# Patient Record
Sex: FEMALE | Race: WHITE | NOT HISPANIC OR LATINO | URBAN - METROPOLITAN AREA
[De-identification: names, ages, dates, MRNs, and addresses within clinical notes are randomized per-mention and may not be internally consistent; named-entity substitution may affect disease eponyms.]

---

## 2020-02-04 ENCOUNTER — INPATIENT (INPATIENT)
Facility: HOSPITAL | Age: 85
LOS: 5 days | Discharge: HOME | DRG: 686 | End: 2020-02-10
Attending: HOSPITALIST | Admitting: HOSPITALIST
Payer: MEDICARE

## 2020-02-04 VITALS
SYSTOLIC BLOOD PRESSURE: 124 MMHG | TEMPERATURE: 98 F | RESPIRATION RATE: 16 BRPM | OXYGEN SATURATION: 97 % | HEART RATE: 84 BPM | DIASTOLIC BLOOD PRESSURE: 68 MMHG | WEIGHT: 115.08 LBS | HEIGHT: 63 IN

## 2020-02-04 DIAGNOSIS — C79.9 SECONDARY MALIGNANT NEOPLASM OF UNSPECIFIED SITE: ICD-10-CM

## 2020-02-04 DIAGNOSIS — R09.89 OTHER SPECIFIED SYMPTOMS AND SIGNS INVOLVING THE CIRCULATORY AND RESPIRATORY SYSTEMS: ICD-10-CM

## 2020-02-04 DIAGNOSIS — Z90.710 ACQUIRED ABSENCE OF BOTH CERVIX AND UTERUS: Chronic | ICD-10-CM

## 2020-02-04 LAB
ALBUMIN SERPL ELPH-MCNC: 3.3 G/DL — SIGNIFICANT CHANGE UP (ref 3.3–5.2)
ALP SERPL-CCNC: 277 U/L — HIGH (ref 40–120)
ALT FLD-CCNC: 30 U/L — SIGNIFICANT CHANGE UP
ANION GAP SERPL CALC-SCNC: 16 MMOL/L — SIGNIFICANT CHANGE UP (ref 5–17)
AST SERPL-CCNC: 52 U/L — HIGH
BASOPHILS # BLD AUTO: 0.03 K/UL — SIGNIFICANT CHANGE UP (ref 0–0.2)
BASOPHILS NFR BLD AUTO: 0.1 % — SIGNIFICANT CHANGE UP (ref 0–2)
BILIRUB SERPL-MCNC: 0.8 MG/DL — SIGNIFICANT CHANGE UP (ref 0.4–2)
BLD GP AB SCN SERPL QL: SIGNIFICANT CHANGE UP
BUN SERPL-MCNC: 34 MG/DL — HIGH (ref 8–20)
CALCIUM SERPL-MCNC: 9.7 MG/DL — SIGNIFICANT CHANGE UP (ref 8.6–10.2)
CHLORIDE SERPL-SCNC: 87 MMOL/L — LOW (ref 98–107)
CO2 SERPL-SCNC: 20 MMOL/L — LOW (ref 22–29)
CREAT SERPL-MCNC: 1.03 MG/DL — SIGNIFICANT CHANGE UP (ref 0.5–1.3)
EOSINOPHIL # BLD AUTO: 0.07 K/UL — SIGNIFICANT CHANGE UP (ref 0–0.5)
EOSINOPHIL NFR BLD AUTO: 0.3 % — SIGNIFICANT CHANGE UP (ref 0–6)
GLUCOSE SERPL-MCNC: 88 MG/DL — SIGNIFICANT CHANGE UP (ref 70–99)
HCT VFR BLD CALC: 33.5 % — LOW (ref 34.5–45)
HGB BLD-MCNC: 10.7 G/DL — LOW (ref 11.5–15.5)
IMM GRANULOCYTES NFR BLD AUTO: 0.8 % — SIGNIFICANT CHANGE UP (ref 0–1.5)
LIDOCAIN IGE QN: 34 U/L — SIGNIFICANT CHANGE UP (ref 22–51)
LYMPHOCYTES # BLD AUTO: 0.54 K/UL — LOW (ref 1–3.3)
LYMPHOCYTES # BLD AUTO: 2.5 % — LOW (ref 13–44)
MCHC RBC-ENTMCNC: 27.5 PG — SIGNIFICANT CHANGE UP (ref 27–34)
MCHC RBC-ENTMCNC: 31.9 GM/DL — LOW (ref 32–36)
MCV RBC AUTO: 86.1 FL — SIGNIFICANT CHANGE UP (ref 80–100)
MONOCYTES # BLD AUTO: 0.99 K/UL — HIGH (ref 0–0.9)
MONOCYTES NFR BLD AUTO: 4.7 % — SIGNIFICANT CHANGE UP (ref 2–14)
NEUTROPHILS # BLD AUTO: 19.4 K/UL — HIGH (ref 1.8–7.4)
NEUTROPHILS NFR BLD AUTO: 91.6 % — HIGH (ref 43–77)
NT-PROBNP SERPL-SCNC: 2397 PG/ML — HIGH (ref 0–300)
PLATELET # BLD AUTO: 405 K/UL — HIGH (ref 150–400)
POTASSIUM SERPL-MCNC: 4.9 MMOL/L — SIGNIFICANT CHANGE UP (ref 3.5–5.3)
POTASSIUM SERPL-SCNC: 4.9 MMOL/L — SIGNIFICANT CHANGE UP (ref 3.5–5.3)
PROT SERPL-MCNC: 6.6 G/DL — SIGNIFICANT CHANGE UP (ref 6.6–8.7)
RBC # BLD: 3.89 M/UL — SIGNIFICANT CHANGE UP (ref 3.8–5.2)
RBC # FLD: 12.6 % — SIGNIFICANT CHANGE UP (ref 10.3–14.5)
SODIUM SERPL-SCNC: 123 MMOL/L — LOW (ref 135–145)
WBC # BLD: 21.2 K/UL — HIGH (ref 3.8–10.5)
WBC # FLD AUTO: 21.2 K/UL — HIGH (ref 3.8–10.5)

## 2020-02-04 PROCEDURE — 93970 EXTREMITY STUDY: CPT | Mod: 26

## 2020-02-04 PROCEDURE — 72125 CT NECK SPINE W/O DYE: CPT | Mod: 26

## 2020-02-04 PROCEDURE — 99285 EMERGENCY DEPT VISIT HI MDM: CPT

## 2020-02-04 PROCEDURE — 74176 CT ABD & PELVIS W/O CONTRAST: CPT | Mod: 26

## 2020-02-04 PROCEDURE — 99223 1ST HOSP IP/OBS HIGH 75: CPT

## 2020-02-04 PROCEDURE — 70450 CT HEAD/BRAIN W/O DYE: CPT | Mod: 26

## 2020-02-04 PROCEDURE — 73090 X-RAY EXAM OF FOREARM: CPT | Mod: 26,LT

## 2020-02-04 PROCEDURE — 71275 CT ANGIOGRAPHY CHEST: CPT | Mod: 26

## 2020-02-04 PROCEDURE — 71250 CT THORAX DX C-: CPT | Mod: 26,59

## 2020-02-04 PROCEDURE — 93010 ELECTROCARDIOGRAM REPORT: CPT

## 2020-02-04 PROCEDURE — 73080 X-RAY EXAM OF ELBOW: CPT | Mod: 26,LT

## 2020-02-04 RX ORDER — TETANUS TOXOID, REDUCED DIPHTHERIA TOXOID AND ACELLULAR PERTUSSIS VACCINE, ADSORBED 5; 2.5; 8; 8; 2.5 [IU]/.5ML; [IU]/.5ML; UG/.5ML; UG/.5ML; UG/.5ML
0.5 SUSPENSION INTRAMUSCULAR ONCE
Refills: 0 | Status: COMPLETED | OUTPATIENT
Start: 2020-02-04 | End: 2020-02-04

## 2020-02-04 RX ORDER — LANOLIN ALCOHOL/MO/W.PET/CERES
3 CREAM (GRAM) TOPICAL AT BEDTIME
Refills: 0 | Status: DISCONTINUED | OUTPATIENT
Start: 2020-02-04 | End: 2020-02-10

## 2020-02-04 RX ORDER — AZITHROMYCIN 500 MG/1
500 TABLET, FILM COATED ORAL ONCE
Refills: 0 | Status: COMPLETED | OUTPATIENT
Start: 2020-02-04 | End: 2020-02-04

## 2020-02-04 RX ORDER — HYDROMORPHONE HYDROCHLORIDE 2 MG/ML
0.5 INJECTION INTRAMUSCULAR; INTRAVENOUS; SUBCUTANEOUS EVERY 4 HOURS
Refills: 0 | Status: DISCONTINUED | OUTPATIENT
Start: 2020-02-04 | End: 2020-02-10

## 2020-02-04 RX ORDER — MORPHINE SULFATE 50 MG/1
2 CAPSULE, EXTENDED RELEASE ORAL ONCE
Refills: 0 | Status: DISCONTINUED | OUTPATIENT
Start: 2020-02-04 | End: 2020-02-04

## 2020-02-04 RX ORDER — ENOXAPARIN SODIUM 100 MG/ML
50 INJECTION SUBCUTANEOUS ONCE
Refills: 0 | Status: COMPLETED | OUTPATIENT
Start: 2020-02-04 | End: 2020-02-04

## 2020-02-04 RX ORDER — OXYCODONE HYDROCHLORIDE 5 MG/1
5 TABLET ORAL EVERY 4 HOURS
Refills: 0 | Status: DISCONTINUED | OUTPATIENT
Start: 2020-02-04 | End: 2020-02-10

## 2020-02-04 RX ORDER — GABAPENTIN 400 MG/1
200 CAPSULE ORAL THREE TIMES A DAY
Refills: 0 | Status: DISCONTINUED | OUTPATIENT
Start: 2020-02-04 | End: 2020-02-10

## 2020-02-04 RX ORDER — CEFTRIAXONE 500 MG/1
1000 INJECTION, POWDER, FOR SOLUTION INTRAMUSCULAR; INTRAVENOUS ONCE
Refills: 0 | Status: COMPLETED | OUTPATIENT
Start: 2020-02-04 | End: 2020-02-04

## 2020-02-04 RX ORDER — SODIUM CHLORIDE 9 MG/ML
1000 INJECTION, SOLUTION INTRAVENOUS
Refills: 0 | Status: DISCONTINUED | OUTPATIENT
Start: 2020-02-04 | End: 2020-02-05

## 2020-02-04 RX ORDER — VERAPAMIL HCL 240 MG
40 CAPSULE, EXTENDED RELEASE PELLETS 24 HR ORAL EVERY 8 HOURS
Refills: 0 | Status: DISCONTINUED | OUTPATIENT
Start: 2020-02-04 | End: 2020-02-08

## 2020-02-04 RX ADMIN — Medication 3 MILLIGRAM(S): at 22:52

## 2020-02-04 RX ADMIN — Medication 40 MILLIGRAM(S): at 22:51

## 2020-02-04 RX ADMIN — CEFTRIAXONE 100 MILLIGRAM(S): 500 INJECTION, POWDER, FOR SOLUTION INTRAMUSCULAR; INTRAVENOUS at 19:51

## 2020-02-04 RX ADMIN — GABAPENTIN 200 MILLIGRAM(S): 400 CAPSULE ORAL at 22:50

## 2020-02-04 RX ADMIN — MORPHINE SULFATE 2 MILLIGRAM(S): 50 CAPSULE, EXTENDED RELEASE ORAL at 14:18

## 2020-02-04 RX ADMIN — ENOXAPARIN SODIUM 50 MILLIGRAM(S): 100 INJECTION SUBCUTANEOUS at 20:01

## 2020-02-04 RX ADMIN — TETANUS TOXOID, REDUCED DIPHTHERIA TOXOID AND ACELLULAR PERTUSSIS VACCINE, ADSORBED 0.5 MILLILITER(S): 5; 2.5; 8; 8; 2.5 SUSPENSION INTRAMUSCULAR at 13:21

## 2020-02-04 RX ADMIN — HYDROMORPHONE HYDROCHLORIDE 0.5 MILLIGRAM(S): 2 INJECTION INTRAMUSCULAR; INTRAVENOUS; SUBCUTANEOUS at 20:02

## 2020-02-04 RX ADMIN — AZITHROMYCIN 255 MILLIGRAM(S): 500 TABLET, FILM COATED ORAL at 19:49

## 2020-02-04 NOTE — H&P ADULT - HISTORY OF PRESENT ILLNESS
88 yo female with Bladder cancer presented with complaints of lower back pain and hip pain after fall at home . Pt had fallen yesterday at home while walking , her legs were numb and fall backwards . Pt is having pain since yesterday lower back across and both hip area groin , unable  to walk and stand , pain worse on movements . She also reports discomfort  and swelling in lower extremities. She denies any cough , no fever denies any respiratory symptoms , [facility] and [facility]

## 2020-02-04 NOTE — ED ADULT TRIAGE NOTE - CHIEF COMPLAINT QUOTE
Mechanical fall while walking with walker today at 0300, unable to get up after fall until neighbor arrived and called EMS.  Hit back of head.  Denies LOC or blood thinners.  Skin tears to left arm present upon arrival.

## 2020-02-04 NOTE — ED PROVIDER NOTE - OBJECTIVE STATEMENT
The patient is a 89 year old female presents with mechanical fall complaining of pelvic pain. Hit the head but no LOC and No HA  No neck pain, No CP, No SOB, No abd pain  The patient unable to ambulate

## 2020-02-04 NOTE — ED ADULT NURSE NOTE - OBJECTIVE STATEMENT
Pt is here with c/o back pain and L arm pain from a fall.  P/s she got OOB at 3am with her walker  and when she went to go back to bed she tripped and fell backwards.  P/s she was unable to get up and remained on the floor for approx 7 hours.  Pt denies hitting her head, denies LOC.  Pt is a/ox3, and  c/o low back pain and L arm pain.

## 2020-02-04 NOTE — H&P ADULT - ASSESSMENT
89 yo female with bladder  cancer presented to the ED with fall , hip pain and lower back pain , unable to walk since fall. In the ED found to have leukocytosis  CT of chest abd showed hepatic lesion , lung nodules probable ,mets and lung opacity     1- lower back pain with leg pain s/p fall   fracture ruled out   pain meds  PT ambulated     US of lower ext r/o DVT     2- Bladder ca with heaptic lesion and bone mets   will try to reach her oncologist to discuss prognosis nad all findings   cont pain meds as needed     3- HTN - cont home meds with holding parameters   cont verapamil

## 2020-02-04 NOTE — ED PROVIDER NOTE - CLINICAL SUMMARY MEDICAL DECISION MAKING FREE TEXT BOX
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

## 2020-02-04 NOTE — H&P ADULT - NSHPLABSRESULTS_GEN_ALL_CORE
10.7   21.20 )-----------( 405      ( 04 Feb 2020 13:24 )             33.5   02-04    123<L>  |  87<L>  |  34.0<H>  ----------------------------<  88  4.9   |  20.0<L>  |  1.03    Ca    9.7      04 Feb 2020 13:24    TPro  6.6  /  Alb  3.3  /  TBili  0.8  /  DBili  x   /  AST  52<H>  /  ALT  30  /  AlkPhos  277<H>  02-04    Serum Pro-Brain Natriuretic Peptide: 2397: NT-proBNP Interpretive comments:  Acute Congestive Heart Failure is unlikely if NT-proBNP is less than 300  pg/mL, for any age.  Consider Acute Congestive Heart Failure if:  AGE               NT-proBNP Result  ___               ________________  < 50year           > 450 pg/mL  50 - 75 years     > 900 pg/mL  > 75 years         > 1800 pg/ml  All results require clinical correlation. Consider obtaining a baseline or  "dry" NT-proBNP level when the patient is stabilized, so that subsequent  levels can be related to that. Patients with recurrent CHF may have  elevated  NT-proBNP levels. Acute failure episodes generally produce levels at  least 25  % greater than base line levels. The above values are derived from a large  multi-center international study, "GEOVANY Roth, et al, European Heart  Journal,  2006; 27:330-337. pg/mL (02.04.20 @ 14:55)  < from: Xray Forearm, Left (02.04.20 @ 12:27) >    IMPRESSION: No acutebony finding.    Soft tissue air around the elbow suggests local trauma.    < end of copied text >    < from: CT Chest No Cont (02.04.20 @ 12:45) >    IMPRESSION:     6.2 cm mass along the right bladder wall consistent with bladder cancer which results in moderate right hydronephrosis.    Hepatic metastasis.    Pulmonary nodules, enlarged subcarinal lymph node, and right iliac chain lymphadenopathy, likely metastatic disease.    6 cm pelvic mass with associated destruction of the right inferior pubic ramus, likely a metastasis.    Vague areas of sclerosis in the thoracic spine, possibly metastatic disease; a thoracic spine MRI could be obtained for further evaluation.    2.4 cm peripheral opacity in the right lower lobe; differential includes pneumonia, atelectasis or a pulmonary infarct; a CTA of the chest could be obtained for further evaluation if there are no contraindications to intravenous contrast.    The findings were discussed with Dr. Berry at 2:35 PM on 2/4/2020.      < end of copied text >

## 2020-02-05 LAB
ANION GAP SERPL CALC-SCNC: 13 MMOL/L — SIGNIFICANT CHANGE UP (ref 5–17)
APPEARANCE UR: CLEAR — SIGNIFICANT CHANGE UP
BACTERIA # UR AUTO: NEGATIVE — SIGNIFICANT CHANGE UP
BILIRUB UR-MCNC: NEGATIVE — SIGNIFICANT CHANGE UP
BUN SERPL-MCNC: 33 MG/DL — HIGH (ref 8–20)
CALCIUM SERPL-MCNC: 8.7 MG/DL — SIGNIFICANT CHANGE UP (ref 8.6–10.2)
CHLORIDE SERPL-SCNC: 89 MMOL/L — LOW (ref 98–107)
CO2 SERPL-SCNC: 21 MMOL/L — LOW (ref 22–29)
COLOR SPEC: YELLOW — SIGNIFICANT CHANGE UP
CREAT SERPL-MCNC: 1.11 MG/DL — SIGNIFICANT CHANGE UP (ref 0.5–1.3)
DIFF PNL FLD: ABNORMAL
EPI CELLS # UR: SIGNIFICANT CHANGE UP
GLUCOSE SERPL-MCNC: 69 MG/DL — LOW (ref 70–99)
GLUCOSE UR QL: NEGATIVE MG/DL — SIGNIFICANT CHANGE UP
HCT VFR BLD CALC: 29.1 % — LOW (ref 34.5–45)
HGB BLD-MCNC: 9.7 G/DL — LOW (ref 11.5–15.5)
KETONES UR-MCNC: ABNORMAL
LEUKOCYTE ESTERASE UR-ACNC: ABNORMAL
MCHC RBC-ENTMCNC: 28.7 PG — SIGNIFICANT CHANGE UP (ref 27–34)
MCHC RBC-ENTMCNC: 33.3 GM/DL — SIGNIFICANT CHANGE UP (ref 32–36)
MCV RBC AUTO: 86.1 FL — SIGNIFICANT CHANGE UP (ref 80–100)
NITRITE UR-MCNC: NEGATIVE — SIGNIFICANT CHANGE UP
OSMOLALITY SERPL: 279 MOSMOL/KG — LOW (ref 280–301)
OSMOLALITY UR: 556 MOSM/KG — SIGNIFICANT CHANGE UP (ref 300–1000)
PH UR: 6 — SIGNIFICANT CHANGE UP (ref 5–8)
PLATELET # BLD AUTO: 351 K/UL — SIGNIFICANT CHANGE UP (ref 150–400)
POTASSIUM SERPL-MCNC: 5 MMOL/L — SIGNIFICANT CHANGE UP (ref 3.5–5.3)
POTASSIUM SERPL-SCNC: 5 MMOL/L — SIGNIFICANT CHANGE UP (ref 3.5–5.3)
PROT UR-MCNC: 100 MG/DL
RBC # BLD: 3.38 M/UL — LOW (ref 3.8–5.2)
RBC # FLD: 12.8 % — SIGNIFICANT CHANGE UP (ref 10.3–14.5)
RBC CASTS # UR COMP ASSIST: SIGNIFICANT CHANGE UP /HPF (ref 0–4)
SODIUM SERPL-SCNC: 123 MMOL/L — LOW (ref 135–145)
SP GR SPEC: 1.01 — SIGNIFICANT CHANGE UP (ref 1.01–1.02)
UROBILINOGEN FLD QL: NEGATIVE MG/DL — SIGNIFICANT CHANGE UP
WBC # BLD: 17.01 K/UL — HIGH (ref 3.8–10.5)
WBC # FLD AUTO: 17.01 K/UL — HIGH (ref 3.8–10.5)
WBC UR QL: SIGNIFICANT CHANGE UP

## 2020-02-05 PROCEDURE — 99233 SBSQ HOSP IP/OBS HIGH 50: CPT

## 2020-02-05 PROCEDURE — 73502 X-RAY EXAM HIP UNI 2-3 VIEWS: CPT | Mod: 26,LT

## 2020-02-05 RX ORDER — KETOROLAC TROMETHAMINE 30 MG/ML
15 SYRINGE (ML) INJECTION EVERY 8 HOURS
Refills: 0 | Status: DISCONTINUED | OUTPATIENT
Start: 2020-02-05 | End: 2020-02-06

## 2020-02-05 RX ORDER — SODIUM CHLORIDE 9 MG/ML
1 INJECTION INTRAMUSCULAR; INTRAVENOUS; SUBCUTANEOUS
Refills: 0 | Status: DISCONTINUED | OUTPATIENT
Start: 2020-02-05 | End: 2020-02-05

## 2020-02-05 RX ORDER — ATORVASTATIN CALCIUM 80 MG/1
20 TABLET, FILM COATED ORAL AT BEDTIME
Refills: 0 | Status: DISCONTINUED | OUTPATIENT
Start: 2020-02-05 | End: 2020-02-10

## 2020-02-05 RX ORDER — SODIUM CHLORIDE 9 MG/ML
1000 INJECTION INTRAMUSCULAR; INTRAVENOUS; SUBCUTANEOUS
Refills: 0 | Status: DISCONTINUED | OUTPATIENT
Start: 2020-02-05 | End: 2020-02-06

## 2020-02-05 RX ORDER — SODIUM CHLORIDE 9 MG/ML
1 INJECTION INTRAMUSCULAR; INTRAVENOUS; SUBCUTANEOUS
Refills: 0 | Status: DISCONTINUED | OUTPATIENT
Start: 2020-02-05 | End: 2020-02-10

## 2020-02-05 RX ORDER — ASPIRIN/CALCIUM CARB/MAGNESIUM 324 MG
81 TABLET ORAL DAILY
Refills: 0 | Status: DISCONTINUED | OUTPATIENT
Start: 2020-02-05 | End: 2020-02-10

## 2020-02-05 RX ORDER — HEPARIN SODIUM 5000 [USP'U]/ML
5000 INJECTION INTRAVENOUS; SUBCUTANEOUS EVERY 12 HOURS
Refills: 0 | Status: DISCONTINUED | OUTPATIENT
Start: 2020-02-05 | End: 2020-02-07

## 2020-02-05 RX ORDER — ACETAMINOPHEN 500 MG
975 TABLET ORAL EVERY 8 HOURS
Refills: 0 | Status: COMPLETED | OUTPATIENT
Start: 2020-02-05 | End: 2020-02-07

## 2020-02-05 RX ADMIN — ATORVASTATIN CALCIUM 20 MILLIGRAM(S): 80 TABLET, FILM COATED ORAL at 21:35

## 2020-02-05 RX ADMIN — OXYCODONE HYDROCHLORIDE 5 MILLIGRAM(S): 5 TABLET ORAL at 10:54

## 2020-02-05 RX ADMIN — OXYCODONE HYDROCHLORIDE 5 MILLIGRAM(S): 5 TABLET ORAL at 11:00

## 2020-02-05 RX ADMIN — SODIUM CHLORIDE 1 GRAM(S): 9 INJECTION INTRAMUSCULAR; INTRAVENOUS; SUBCUTANEOUS at 15:33

## 2020-02-05 RX ADMIN — GABAPENTIN 200 MILLIGRAM(S): 400 CAPSULE ORAL at 05:26

## 2020-02-05 RX ADMIN — Medication 81 MILLIGRAM(S): at 17:58

## 2020-02-05 RX ADMIN — GABAPENTIN 200 MILLIGRAM(S): 400 CAPSULE ORAL at 21:35

## 2020-02-05 RX ADMIN — Medication 15 MILLIGRAM(S): at 21:35

## 2020-02-05 RX ADMIN — GABAPENTIN 200 MILLIGRAM(S): 400 CAPSULE ORAL at 15:34

## 2020-02-05 RX ADMIN — Medication 40 MILLIGRAM(S): at 05:26

## 2020-02-05 RX ADMIN — HEPARIN SODIUM 5000 UNIT(S): 5000 INJECTION INTRAVENOUS; SUBCUTANEOUS at 17:56

## 2020-02-05 RX ADMIN — Medication 15 MILLIGRAM(S): at 22:05

## 2020-02-05 RX ADMIN — SODIUM CHLORIDE 85 MILLILITER(S): 9 INJECTION, SOLUTION INTRAVENOUS at 05:27

## 2020-02-05 RX ADMIN — Medication 3 MILLIGRAM(S): at 21:36

## 2020-02-05 RX ADMIN — SODIUM CHLORIDE 85 MILLILITER(S): 9 INJECTION INTRAMUSCULAR; INTRAVENOUS; SUBCUTANEOUS at 15:31

## 2020-02-05 RX ADMIN — Medication 975 MILLIGRAM(S): at 21:36

## 2020-02-05 RX ADMIN — Medication 975 MILLIGRAM(S): at 15:36

## 2020-02-05 RX ADMIN — Medication 975 MILLIGRAM(S): at 21:34

## 2020-02-05 RX ADMIN — Medication 15 MILLIGRAM(S): at 15:26

## 2020-02-05 RX ADMIN — SODIUM CHLORIDE 1 GRAM(S): 9 INJECTION INTRAMUSCULAR; INTRAVENOUS; SUBCUTANEOUS at 21:35

## 2020-02-05 RX ADMIN — SODIUM CHLORIDE 85 MILLILITER(S): 9 INJECTION INTRAMUSCULAR; INTRAVENOUS; SUBCUTANEOUS at 17:56

## 2020-02-05 NOTE — CONSULT NOTE ADULT - ASSESSMENT
89 yo female with bladder  cancer presented to the ED with fall , hip pain and lower back pain , unable to walk since fall. In the ED found to have leukocytosis  CT of chest abd showed hepatic lesion , lung nodules probable ,mets and lung opacity . Has  Bladder ca with heaptic lesion and bone mets    Now has hyponatremia - most likely SIADH - check w/u  Add NAcl Tabs POF resriction to 1000 cc/  Gentle hydration with NS   Need to know Immunotherapy to r/o as a cause - no obv meds known at present    Needs Neuro eval for LE Wkness    lower back pain with leg pain s/p fall - no Fx     US of lower ext r/o DVT       HTN - cont home meds with holding parameters   cont verapamil

## 2020-02-05 NOTE — PROGRESS NOTE ADULT - ASSESSMENT
88 yo with metastatic bladder cancer with lung , bone lesions per her oncologist Dr Escamilla, admitted post fall with unability to walk and severe intractable pain in the left leg       1- Intractable leg pain s/p fall at home   xray of femur hip ordered     2- hyponatremia likely SIADH   fluid restriction   renal consulted   add na tablets     3- Leukocytosis - cronic per her oncologist   no sign of infection , asymptomatic no fever   PNA is ruled out     4- Metastatic bladder ca bone , lung and hepatic lesions seen     d/w her oncologist     discharge soon to [facility]

## 2020-02-05 NOTE — CONSULT NOTE ADULT - SUBJECTIVE AND OBJECTIVE BOX
89 year old female with HTN with recently diagnosed metastatic bladder cancer with lung, liver and bone metastasis (including a sacral mass). She says that she tripped and fell in her home when it was late at night. She injured her hip and was in a lot of pain and was not able to get up on her own. She remained on the floor, unable to reach her phone, for about 7 hours until a friend of hers who possesses a key to her home entered and helped her.  She has a difficult time bending her legs as it causes a lot of pain. She lives alone.     PAST MEDICAL & SURGICAL HISTORY:  HTN (hypertension)  Cancer of bladder  H/O: hysterectomy    MEDICATIONS  (STANDING):  acetaminophen   Tablet .. 975 milliGRAM(s) Oral every 8 hours  aspirin enteric coated 81 milliGRAM(s) Oral daily  atorvastatin 20 milliGRAM(s) Oral at bedtime  gabapentin 200 milliGRAM(s) Oral three times a day  heparin  Injectable 5000 Unit(s) SubCutaneous every 12 hours  ketorolac   Injectable 15 milliGRAM(s) IV Push every 8 hours  melatonin 3 milliGRAM(s) Oral at bedtime  sodium chloride   Oral Tab/Cap - Peds 1 Gram(s) Oral two times a day  sodium chloride 0.9%. 1000 milliLiter(s) (85 mL/Hr) IV Continuous <Continuous>  verapamil 40 milliGRAM(s) Oral every 8 hours    MEDICATIONS  (PRN):  HYDROmorphone  Injectable 0.5 milliGRAM(s) IV Push every 4 hours PRN Severe Pain (7 - 10)  oxyCODONE    IR 5 milliGRAM(s) Oral every 4 hours PRN Moderate Pain (4 - 6)      Vital Signs Last 24 Hrs  T(C): 37 (05 Feb 2020 17:47), Max: 37 (05 Feb 2020 05:13)  T(F): 98.6 (05 Feb 2020 17:47), Max: 98.6 (05 Feb 2020 05:13)  HR: 78 (05 Feb 2020 21:33) (59 - 85)  BP: 108/58 (05 Feb 2020 21:33) (104/55 - 134/78)  BP(mean): --  RR: 18 (05 Feb 2020 17:47) (18 - 20)  SpO2: 97% (05 Feb 2020 17:47) (96% - 97%)  PE:  Gen: nad  ENT: no lad  GI: soft, nd, nt  MSK: no joint swelling. raising of leg very painful in her left hemipelvis                            9.7    17.01 )-----------( 351      ( 05 Feb 2020 06:37 )             29.1     02-05    123<L>  |  89<L>  |  33.0<H>  ----------------------------<  69<L>  5.0   |  21.0<L>  |  1.11    Ca    8.7      05 Feb 2020 06:37    TPro  6.6  /  Alb  3.3  /  TBili  0.8  /  DBili  x   /  AST  52<H>  /  ALT  30  /  AlkPhos  277<H>  02-04

## 2020-02-05 NOTE — CONSULT NOTE ADULT - ASSESSMENT
1) Metastatic bladder cancer - she is s/p 1 dose of immunotherapy (pembrolizumab) on 1/28/2020. From an oncology perspective she tolerated her first dose of therapy without difficulty. Unfortunately, her fall injury was quite severe and she is not able to ambulate on her own. She normally uses a walker and is able to ambulate but now she is bedridden due to pain. I told her that she needs assistance at home and it is not safe for her to live at home alone. Other than the pain she is experiencing after the fall she had a decent performance status and was independent.  - continue with pain control  - not safe for her to live alone, particularly if she is going to continue with immunotherapy. Treatment is palliative.  - ruling out fracture  - follow up with primary oncologist, Dr. Escamilla, as outpatient to discuss when or if to restart immunotherapy.     2) Normocytic anemia - hgb 9.7. Likely secondary to malignancy but also possible nutritional deficiency. Check ferritin, iron saturation. Vitamin B12. Has been low in the recent past.  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 1) Metastatic bladder cancer - she is s/p 1 dose of immunotherapy (pembrolizumab) on 1/28/2020. From an oncology perspective she tolerated her first dose of therapy without difficulty. Unfortunately, her fall injury was quite severe and she is not able to ambulate on her own. She normally uses a walker and is able to ambulate but now she is bedridden due to pain. I told her that she needs assistance at home and it is not safe for her to live at home alone. Other than the pain she is experiencing after the fall she had a decent performance status and was independent.  - continue with pain control  - not safe for her to live alone, particularly if she is going to continue with immunotherapy. Treatment is palliative.  - ruling out fracture  - follow up with primary oncologist, Dr. Escamilla, as outpatient to discuss when or if to restart immunotherapy.  - palliative care consult  - prognosis poor without treatment but regardless incurable disease.     2) Normocytic anemia - hgb 9.7. Likely secondary to malignancy but also possible nutritional deficiency. Check ferritin, iron saturation. Vitamin B12. Has been low in the recent past.  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

## 2020-02-05 NOTE — PROGRESS NOTE ADULT - SUBJECTIVE AND OBJECTIVE BOX
Internal Medicine Hospitalist Progress Note    follow up for back pain , leg pain s/p fall   seen in am , she is with no lowrr back pain , has pain in the left hip worse with movements         ROS: as above, all remaining ROS are negative.       BACKGROUND:  MEDICATIONS  (STANDING):  acetaminophen   Tablet .. 975 milliGRAM(s) Oral every 8 hours  aspirin enteric coated 81 milliGRAM(s) Oral daily  atorvastatin 20 milliGRAM(s) Oral at bedtime  gabapentin 200 milliGRAM(s) Oral three times a day  heparin  Injectable 5000 Unit(s) SubCutaneous every 12 hours  ketorolac   Injectable 15 milliGRAM(s) IV Push every 8 hours  melatonin 3 milliGRAM(s) Oral at bedtime  sodium chloride   Oral Tab/Cap - Peds 1 Gram(s) Oral two times a day  sodium chloride 0.9%. 1000 milliLiter(s) (85 mL/Hr) IV Continuous <Continuous>  verapamil 40 milliGRAM(s) Oral every 8 hours    MEDICATIONS  (PRN):  HYDROmorphone  Injectable 0.5 milliGRAM(s) IV Push every 4 hours PRN Severe Pain (7 - 10)  oxyCODONE    IR 5 milliGRAM(s) Oral every 4 hours PRN Moderate Pain (4 - 6)    Allergies    No Known Allergies    Intolerances            VITALS:  Vital Signs Last 24 Hrs  T(C): 36.9 (05 Feb 2020 15:15), Max: 37 (04 Feb 2020 19:58)  T(F): 98.4 (05 Feb 2020 15:15), Max: 98.6 (04 Feb 2020 19:58)  HR: 82 (05 Feb 2020 15:15) (59 - 92)  BP: 107/68 (05 Feb 2020 15:15) (104/58 - 134/78)  BP(mean): --  RR: 20 (05 Feb 2020 15:15) (18 - 20)  SpO2: 97% (05 Feb 2020 15:15) (96% - 98%) Daily     Daily   CAPILLARY BLOOD GLUCOSE        I&O's Summary      PHYSICAL EXAM:      Constitutional: awake alert no distress     Neck: supple, no JVD     Back: able to move     Respiratory: CTA bilateral     Cardiovascular: regular s1 /s2     Gastrointestinal: soft no tenderness , BS positive     Extremities: no pretibial edema       Musculoskeletal:can not move left leg due to pain     Psychiatric: normal affect , pleasant           LABS:                        9.7    17.01 )-----------( 351      ( 05 Feb 2020 06:37 )             29.1     02-05    123<L>  |  89<L>  |  33.0<H>  ----------------------------<  69<L>  5.0   |  21.0<L>  |  1.11    Ca    8.7      05 Feb 2020 06:37    TPro  6.6  /  Alb  3.3  /  TBili  0.8  /  DBili  x   /  AST  52<H>  /  ALT  30  /  AlkPhos  277<H>  02-04        Radiology :

## 2020-02-05 NOTE — CONSULT NOTE ADULT - SUBJECTIVE AND OBJECTIVE BOX
Patient is a 89y old  Female who presents with a chief complaint of groin pain post fall (2020 16:49)  Found with low Na 123      HPI:  89 yo female with Bladder cancer presented with complaints of lower back pain and hip pain after fall at home . Pt had fallen yesterday at home while walking , her legs were numb and fall backwards . Pt is having pain since yesterday lower back across and both hip area groin , unable  to walk and stand , pain worse on movements . She also reports discomfort  and swelling in lower extremities. She denies any cough , no fever denies any respiratory symptoms , [facility] (2020 16:49)      PAST MEDICAL & SURGICAL HISTORY:  HTN (hypertension)  Cancer of bladder  H/O: hysterectomy      FAMILY HISTORY:  FH: stroke: dad had  at age 69  FH: diabetes mellitus: mother  at age 79      Social History:   -smoke   - Alcohol   -    Drugs        REVIEW OF SYSTEMS:  CONSTITUTIONAL: No fever, weight loss, or fatigue  EYES: No eye pain, No visual disturbances,   RESPIRATORY: No cough, hemoptysis; - SOB  CARDIOVASCULAR: No chest pain, No palpitations,   -leg swelling  GASTROINTESTINAL: No abdominal , NVD or GIB  GENITOURINARY: No UTI sx/hematuria  NEUROLOGICAL: No HA, +wkness both LEs  MUSCULOSKELETAL: No joint pain, No swelling      Vital Signs Last 24 Hrs  T(C): 36.4 (2020 12:02), Max: 37 (2020 19:58)  T(F): 97.5 (2020 12:02), Max: 98.6 (2020 19:58)  HR: 81 (2020 12:02) (59 - 92)  BP: 106/65 (2020 12:02) (104/58 - 134/78)  BP(mean): --  RR: 18 (2020 07:32) (18 - 18)  SpO2: 96% (2020 07:32) (96% - 98%)    PHYSICAL EXAM:    GENERAL: NAD,   EYES:  conjunctiva and sclera clear  NECK: Supple, No JVD/Carotid Bruit -ve   NERVOUS SYSTEM:  A/O x3, LE power 3/5, equal; UE 3-4/5, equal, DTR +  Lungs : No rales, No rhonchi,   HEART:  No murmur,  No rub, No gallops  ABDOMEN: Soft, NT/ND BS+  EXTREMITIES:  + Peripheral Pulses, - edema  SKIN: No rashes or lesions      LABS:                        9.7    17.01 )-----------( 351      ( 2020 06:37 )             29.1     02-05    123<L>  |  89<L>  |  33.0<H>  ----------------------------<  69<L>  5.0   |  21.0<L>  |  1.11    Ca    8.7      2020 06:37    TPro  6.6  /  Alb  3.3  /  TBili  0.8  /  DBili  x   /  AST  52<H>  /  ALT  30  /  AlkPhos  277<H>  02-04      Urinalysis Basic - ( 2020 10:01 )    Color: Yellow / Appearance: Clear / S.010 / pH: x  Gluc: x / Ketone: Small  / Bili: Negative / Urobili: Negative mg/dL   Blood: x / Protein: 100 mg/dL / Nitrite: Negative   Leuk Esterase: Trace / RBC: 0-2 /HPF / WBC 3-5   Sq Epi: x / Non Sq Epi: Occasional / Bacteria: Negative          RADIOLOGY & ADDITIONAL TESTS: Multiple lung and few liver lesions - c/w mets    MEDICATIONS  (STANDING):  acetaminophen   Tablet ..  atorvastatin  gabapentin  heparin  Injectable  HYDROmorphone  Injectable PRN  ketorolac   Injectable  melatonin  oxyCODONE    IR PRN  sodium chloride  sodium chloride 0.9%.  verapamil

## 2020-02-05 NOTE — PHYSICAL THERAPY INITIAL EVALUATION ADULT - ACTIVE RANGE OF MOTION EXAMINATION, REHAB EVAL
bilateral  lower extremity Active ROM was WFL (within functional limits)/deficits as listed below/Bilateral UE AROM shd flexion 0-45, preexisting shd issues, Limited ROM is baseline
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
## 2020-02-06 DIAGNOSIS — R29.898 OTHER SYMPTOMS AND SIGNS INVOLVING THE MUSCULOSKELETAL SYSTEM: ICD-10-CM

## 2020-02-06 DIAGNOSIS — F41.9 ANXIETY DISORDER, UNSPECIFIED: ICD-10-CM

## 2020-02-06 DIAGNOSIS — I63.81 OTHER CEREBRAL INFARCTION DUE TO OCCLUSION OR STENOSIS OF SMALL ARTERY: ICD-10-CM

## 2020-02-06 DIAGNOSIS — C67.8 MALIGNANT NEOPLASM OF OVERLAPPING SITES OF BLADDER: ICD-10-CM

## 2020-02-06 DIAGNOSIS — Z71.89 OTHER SPECIFIED COUNSELING: ICD-10-CM

## 2020-02-06 LAB
ALBUMIN SERPL ELPH-MCNC: 2.5 G/DL — LOW (ref 3.3–5.2)
ALP SERPL-CCNC: 245 U/L — HIGH (ref 40–120)
ALT FLD-CCNC: 24 U/L — SIGNIFICANT CHANGE UP
ANION GAP SERPL CALC-SCNC: 13 MMOL/L — SIGNIFICANT CHANGE UP (ref 5–17)
AST SERPL-CCNC: 43 U/L — HIGH
BILIRUB SERPL-MCNC: 0.5 MG/DL — SIGNIFICANT CHANGE UP (ref 0.4–2)
BUN SERPL-MCNC: 48 MG/DL — HIGH (ref 8–20)
CALCIUM SERPL-MCNC: 8.5 MG/DL — LOW (ref 8.6–10.2)
CHLORIDE SERPL-SCNC: 94 MMOL/L — LOW (ref 98–107)
CO2 SERPL-SCNC: 19 MMOL/L — LOW (ref 22–29)
CREAT SERPL-MCNC: 1.84 MG/DL — HIGH (ref 0.5–1.3)
GLUCOSE SERPL-MCNC: 90 MG/DL — SIGNIFICANT CHANGE UP (ref 70–99)
HCT VFR BLD CALC: 28.2 % — LOW (ref 34.5–45)
HGB BLD-MCNC: 8.9 G/DL — LOW (ref 11.5–15.5)
MCHC RBC-ENTMCNC: 27.6 PG — SIGNIFICANT CHANGE UP (ref 27–34)
MCHC RBC-ENTMCNC: 31.6 GM/DL — LOW (ref 32–36)
MCV RBC AUTO: 87.3 FL — SIGNIFICANT CHANGE UP (ref 80–100)
PLATELET # BLD AUTO: 342 K/UL — SIGNIFICANT CHANGE UP (ref 150–400)
POTASSIUM SERPL-MCNC: 5.7 MMOL/L — HIGH (ref 3.5–5.3)
POTASSIUM SERPL-SCNC: 5.7 MMOL/L — HIGH (ref 3.5–5.3)
PROT SERPL-MCNC: 5.1 G/DL — LOW (ref 6.6–8.7)
RBC # BLD: 3.23 M/UL — LOW (ref 3.8–5.2)
RBC # FLD: 12.9 % — SIGNIFICANT CHANGE UP (ref 10.3–14.5)
SODIUM SERPL-SCNC: 126 MMOL/L — LOW (ref 135–145)
TSH SERPL-MCNC: 1.11 UIU/ML — SIGNIFICANT CHANGE UP (ref 0.27–4.2)
URATE SERPL-MCNC: 11.2 MG/DL — HIGH (ref 2.4–5.7)
WBC # BLD: 16.9 K/UL — HIGH (ref 3.8–10.5)
WBC # FLD AUTO: 16.9 K/UL — HIGH (ref 3.8–10.5)

## 2020-02-06 PROCEDURE — 99497 ADVNCD CARE PLAN 30 MIN: CPT | Mod: 25

## 2020-02-06 PROCEDURE — 99223 1ST HOSP IP/OBS HIGH 75: CPT

## 2020-02-06 PROCEDURE — 99222 1ST HOSP IP/OBS MODERATE 55: CPT

## 2020-02-06 PROCEDURE — 70551 MRI BRAIN STEM W/O DYE: CPT | Mod: 26

## 2020-02-06 PROCEDURE — 72148 MRI LUMBAR SPINE W/O DYE: CPT | Mod: 26

## 2020-02-06 PROCEDURE — 99233 SBSQ HOSP IP/OBS HIGH 50: CPT

## 2020-02-06 PROCEDURE — 99231 SBSQ HOSP IP/OBS SF/LOW 25: CPT

## 2020-02-06 PROCEDURE — 72146 MRI CHEST SPINE W/O DYE: CPT | Mod: 26

## 2020-02-06 RX ORDER — ALLOPURINOL 300 MG
300 TABLET ORAL DAILY
Refills: 0 | Status: DISCONTINUED | OUTPATIENT
Start: 2020-02-06 | End: 2020-02-10

## 2020-02-06 RX ORDER — ZOLPIDEM TARTRATE 10 MG/1
5 TABLET ORAL AT BEDTIME
Refills: 0 | Status: DISCONTINUED | OUTPATIENT
Start: 2020-02-06 | End: 2020-02-10

## 2020-02-06 RX ORDER — SODIUM CHLORIDE 9 MG/ML
1000 INJECTION, SOLUTION INTRAVENOUS
Refills: 0 | Status: DISCONTINUED | OUTPATIENT
Start: 2020-02-06 | End: 2020-02-07

## 2020-02-06 RX ORDER — POLYETHYLENE GLYCOL 3350 17 G/17G
17 POWDER, FOR SOLUTION ORAL DAILY
Refills: 0 | Status: DISCONTINUED | OUTPATIENT
Start: 2020-02-06 | End: 2020-02-10

## 2020-02-06 RX ORDER — SENNA PLUS 8.6 MG/1
2 TABLET ORAL AT BEDTIME
Refills: 0 | Status: DISCONTINUED | OUTPATIENT
Start: 2020-02-06 | End: 2020-02-10

## 2020-02-06 RX ORDER — SODIUM POLYSTYRENE SULFONATE 4.1 MEQ/G
30 POWDER, FOR SUSPENSION ORAL ONCE
Refills: 0 | Status: COMPLETED | OUTPATIENT
Start: 2020-02-06 | End: 2020-02-06

## 2020-02-06 RX ORDER — TAMSULOSIN HYDROCHLORIDE 0.4 MG/1
0.4 CAPSULE ORAL AT BEDTIME
Refills: 0 | Status: DISCONTINUED | OUTPATIENT
Start: 2020-02-06 | End: 2020-02-10

## 2020-02-06 RX ADMIN — HEPARIN SODIUM 5000 UNIT(S): 5000 INJECTION INTRAVENOUS; SUBCUTANEOUS at 07:05

## 2020-02-06 RX ADMIN — TAMSULOSIN HYDROCHLORIDE 0.4 MILLIGRAM(S): 0.4 CAPSULE ORAL at 22:28

## 2020-02-06 RX ADMIN — SODIUM CHLORIDE 83 MILLILITER(S): 9 INJECTION, SOLUTION INTRAVENOUS at 18:10

## 2020-02-06 RX ADMIN — Medication 975 MILLIGRAM(S): at 07:07

## 2020-02-06 RX ADMIN — Medication 40 MILLIGRAM(S): at 07:04

## 2020-02-06 RX ADMIN — GABAPENTIN 200 MILLIGRAM(S): 400 CAPSULE ORAL at 22:28

## 2020-02-06 RX ADMIN — Medication 81 MILLIGRAM(S): at 13:23

## 2020-02-06 RX ADMIN — Medication 3 MILLIGRAM(S): at 22:28

## 2020-02-06 RX ADMIN — Medication 15 MILLIGRAM(S): at 07:05

## 2020-02-06 RX ADMIN — SODIUM CHLORIDE 1 GRAM(S): 9 INJECTION INTRAMUSCULAR; INTRAVENOUS; SUBCUTANEOUS at 18:09

## 2020-02-06 RX ADMIN — GABAPENTIN 200 MILLIGRAM(S): 400 CAPSULE ORAL at 13:23

## 2020-02-06 RX ADMIN — Medication 975 MILLIGRAM(S): at 22:29

## 2020-02-06 RX ADMIN — Medication 15 MILLIGRAM(S): at 07:07

## 2020-02-06 RX ADMIN — SODIUM CHLORIDE 83 MILLILITER(S): 9 INJECTION, SOLUTION INTRAVENOUS at 13:22

## 2020-02-06 RX ADMIN — GABAPENTIN 200 MILLIGRAM(S): 400 CAPSULE ORAL at 07:05

## 2020-02-06 RX ADMIN — ZOLPIDEM TARTRATE 5 MILLIGRAM(S): 10 TABLET ORAL at 22:28

## 2020-02-06 RX ADMIN — SODIUM POLYSTYRENE SULFONATE 30 GRAM(S): 4.1 POWDER, FOR SUSPENSION ORAL at 08:28

## 2020-02-06 RX ADMIN — Medication 40 MILLIGRAM(S): at 22:28

## 2020-02-06 RX ADMIN — SODIUM CHLORIDE 1 GRAM(S): 9 INJECTION INTRAMUSCULAR; INTRAVENOUS; SUBCUTANEOUS at 07:04

## 2020-02-06 RX ADMIN — Medication 975 MILLIGRAM(S): at 07:04

## 2020-02-06 RX ADMIN — Medication 300 MILLIGRAM(S): at 18:08

## 2020-02-06 RX ADMIN — HEPARIN SODIUM 5000 UNIT(S): 5000 INJECTION INTRAVENOUS; SUBCUTANEOUS at 18:10

## 2020-02-06 RX ADMIN — ATORVASTATIN CALCIUM 20 MILLIGRAM(S): 80 TABLET, FILM COATED ORAL at 22:28

## 2020-02-06 NOTE — CONSULT NOTE ADULT - ASSESSMENT
89 yo female with bladder CA, right hydronephrosis due to bladder tumor obstruction, increased creatinine, lumbar stenosis, cauda equina syndrome most likely  - recommend mark cath for urinary retention  - needs right nephrostomy tube for right ureteral orifice obstruction  - needs neurosurgery consult for lumbar stenosis

## 2020-02-06 NOTE — CONSULT NOTE ADULT - PROBLEM SELECTOR RECOMMENDATION 6
I sat with Patient at her bedside and we discussed her slow deterioration of health andfunctional status. She lives alone, fell and wasn't discovered for many hours.  She is realistic and knows she has a terminal illness. Considering moving to a Nursing Home when discharged for MALINDA Cab[facility]?  We discussed her family, Nenikolay and Nephew, established her friend   Hollie Powell @ [phone] as her HCP; and has already completed [facility] Directives including DNR.DNI.  We have not discussed whether she intended to continue Immunotherapy, which seems unlikely due to difficulty getting to oncologist office for the treatment.  I will explain she would be entitled to Hospice benefit once Immunotherapy is discontinued.  Dr. Escamilla is her Oncologist

## 2020-02-06 NOTE — CONSULT NOTE ADULT - ATTENDING COMMENTS
NSGY Attg:    see above    patient seen and examined by PA staff    imaging reviewed    agree with plan as above
COUNSELING:    Face to face meeting to discuss Advanced Care Planning - Time Spent ___20___ Minutes.  See goals of care note.    More than 50% time spent in counseling and coordinating care. 35___ Minutes.     Thank you for the opportunity to assist with the care of this patient.   [facility] [phone].
patient with Stage 4 bladder cancer with right ureteral obstruction.  Rising creat. If patient desires to maintain maximal renal function would recommend right neph tube.  If only palliative care anticipated then could likely avoid the tube.

## 2020-02-06 NOTE — CONSULT NOTE ADULT - SUBJECTIVE AND OBJECTIVE BOX
HPI:  88 yo female PMH of HTN and  Metastatic Bladder Cancer with last Immunotherapy treatment with Ketruda one week prior to admission. Patient at home using walker sudden weakness felt  in BLLE's before collapsing on floor. Unable to get up off floor independently-was admitted through ED with BLLE weakness; unable to stand or walk, LBP and acute on chronic groin pain.  Associated with increasing swelling in her lower extremities. Appetite poor, eating very small more frequent portions, fluid intake also has decreased. She has insomnia and is taking  Ambien at night. She denies N/V/D/Constipation, CP, Palpitations SOB; not coughing, no fever.     88 yo female with Bladder cancer presented with complaints of lower back pain and hip pain after fall at home . Pt had fallen yesterday at home while walking , her legs were numb and fall backwards . Pt is having pain since yesterday lower back across and both hip area groin , unable  to walk and stand , pain worse on movements . She also reports discomfort  and swelling in lower extremities. She denies any cough , no fever denies any respiratory symptoms , She has HTN and bladder cancer under oncologist care in [facility] had recent immunotherapy (2020 16:49)    PERTINENT PMH REVIEWED: Yes     PAST MEDICAL & SURGICAL HISTORY:  HTN (hypertension)  Cancer of bladder  H/O: hysterectomy      SOCIAL HISTORY:  EtOH    No                                    Drugs    No                                      nonsmoker                                    Admitted from: home Lives alone HCP established today Hollie Powell [phone]     FAMILY HISTORY:  FH: stroke: dad had  at age 69  FH: diabetes mellitus: mother  at age 79    No Known Allergies  Intolerances        Baseline ADLs (prior to admission):  Independent      Present Symptoms:     Dyspnea: 0   Nausea/Vomiting: No  Anxiety:  Yes   Depression:  No  Fatigue: Yes   Loss of appetite: Yes     Pain: Low back B/L hips and groin            Character-            Duration-            Effect-            Factors-associated with weakness-inability to move feet when standing            Frequency-            Location-            Severity-intermittent moderate    Review of Systems: Reviewed                      All others negative    MEDICATIONS  (STANDING):  acetaminophen   Tablet .. 975 milliGRAM(s) Oral every 8 hours  allopurinol 300 milliGRAM(s) Oral daily  aspirin enteric coated 81 milliGRAM(s) Oral daily  atorvastatin 20 milliGRAM(s) Oral at bedtime  gabapentin 200 milliGRAM(s) Oral three times a day  heparin  Injectable 5000 Unit(s) SubCutaneous every 12 hours  melatonin 3 milliGRAM(s) Oral at bedtime  polyethylene glycol 3350 17 Gram(s) Oral daily  senna 2 Tablet(s) Oral at bedtime  sodium chloride   Oral Tab/Cap - Peds 1 Gram(s) Oral two times a day  sodium chloride 0.45% 1000 milliLiter(s) (83 mL/Hr) IV Continuous <Continuous>  tamsulosin 0.4 milliGRAM(s) Oral at bedtime  verapamil 40 milliGRAM(s) Oral every 8 hours    MEDICATIONS  (PRN):  HYDROmorphone  Injectable 0.5 milliGRAM(s) IV Push every 4 hours PRN Severe Pain (7 - 10)  oxyCODONE    IR 5 milliGRAM(s) Oral every 4 hours PRN Moderate Pain (4 - 6)      PHYSICAL EXAM:    Vital Signs Last 24 Hrs  T(C): 36.8 (2020 08:26), Max: 37 (2020 17:47)  T(F): 98.2 (2020 08:26), Max: 98.6 (2020 17:47)  HR: 75 (2020 08:26) (75 - 85)  BP: 105/61 (2020 08:26) (104/55 - 130/76)  BP(mean): --  RR: 18 (2020 08:26) (18 - 18)  SpO2: 97% (2020 08:26) (97% - 98%)    General: alert  oriented x __3__very tired-did not sleep last night                  cachexia      HEENT:  dry mouth      Lungs: comfortable    CV: normal     GI:  distended  tender                 constipation  last BM: denies    : normal voiding small amounts    MSK:  weakness  edema           bedbound/wheelchair bound    Skin: normal __  no rash    LABS:                        8.9    16.90 )-----------( 342      ( 2020 06:48 )             28.2     02-06    126<L>  |  94<L>  |  48.0<H>  ----------------------------<  90  5.7<H>   |  19.0<L>  |  1.84<H>    Ca    8.5<L>      2020 06:48    TPro  5.1<L>  /  Alb  2.5<L>  /  TBili  0.5  /  DBili  x   /  AST  43<H>  /  ALT  24  /  AlkPhos  245<H>  -    Urinalysis Basic - ( 2020 10:01 )    Color: Yellow / Appearance: Clear / S.010 / pH: x  Gluc: x / Ketone: Small  / Bili: Negative / Urobili: Negative mg/dL   Blood: x / Protein: 100 mg/dL / Nitrite: Negative   Leuk Esterase: Trace / RBC: 0-2 /HPF / WBC 3-5   Sq Epi: x / Non Sq Epi: Occasional / Bacteria: Negative    I&O's Summary    2020 07:  -  2020 15:37  --------------------------------------------------------  IN: 0 mL / OUT: 700 mL / NET: -700 mL    RADIOLOGY & ADDITIONAL STUDIES:  < from: MR Head No Cont (20 @ 03:12) >  FINDINGS:    There is a punctate, cortically-based focus of restricted diffusion in the right parietal lobe (series 4, image 25), findings consistent with tiny acute lacunar infarct.  There is no evidence to suggest associated hemorrhage.    There are scattered T2/FLAIR hyperintense changes in the periventricular and subcortical white matter and also in the central gabriel which are nonspecific finding, but most likely represents sequela of moderate chronic microvascular ischemic disease. There is a tiny chronic lacunar infarct in the left caudate nucleus (series 6, image 18). There is mild diffuse cortical sulcal prominence related to underlying brain parenchymal volume loss, which is particularly pronounced in the bilateral parietal and mesial temporal lobes, including the hippocampal formations. Incidentally noted are tiny hippocampal body cysts.    There is no evidence of intracranial mass or hydrocephalus. There are no extra-axial fluid collections.     The visualized skull base flow voids appear patent.    The patient is status post bilateral lens replacement. Visualized intraorbital contents are otherwise unremarkable. There are mild mucosal inflammatory changes of the ethmoid air cells. The mastoid air cells are clear. The visualized soft tissues and osseous structures appear unremarkable.    IMPRESSION:     Tiny cortically-based acute lacunar infarct in the right parietal lobe. No associated hemorrhage.    ADVANCE DIRECTIVES:   DNR YES   Completed on:                     MOLST  YES  Completed on:  Living Will   NO   Completed on:

## 2020-02-06 NOTE — CONSULT NOTE ADULT - PROBLEM SELECTOR RECOMMENDATION 5
Uses Ambien 5 mg PO at bedtime  melatonin already ordered  Very little sleep last night I sat with Patient at her bedside and we discussed her slow deterioration of health andfunctional status. She lives alone, fell and wasn't discovered for many hours.  She is realistic and knows she has a terminal illness. Considering moving to a Nursing Home when discharged for MALINDA Cabrera?  We discussed her family, Nenikolay and Nephew, established her friend   Hollie Powell @ [phone] as her HCP; and has already completed MOLST Directives including DNR.DNI.  We have not discussed whether she intended to continue Immunotherapy, which seems unlikely due to difficulty getting to oncologist office for the treatment.  I will explain she would be entitled to Hospice benefit once Immunotherapy is discontinued.  Dr. Escamilla is her Oncologist

## 2020-02-06 NOTE — PROGRESS NOTE ADULT - SUBJECTIVE AND OBJECTIVE BOX
89 year old female with HTN with recently diagnosed metastatic bladder cancer with lung, liver and bone metastasis (including a sacral mass). She says that she tripped and fell in her home when it was late at night. She injured her hip and was in a lot of pain and was not able to get up on her own. She remained on the floor, unable to reach her phone, for about 7 hours until a friend of hers who possesses a key to her home entered and helped her.  She has a difficult time bending her legs as it causes a lot of pain. She lives alone.   Now developed urinary retention.  Currently denies pain    PAST MEDICAL & SURGICAL HISTORY:  HTN (hypertension)  Cancer of bladder  H/O: hysterectomy    MEDICATIONS  (STANDING):  acetaminophen   Tablet .. 975 milliGRAM(s) Oral every 8 hours  aspirin enteric coated 81 milliGRAM(s) Oral daily  atorvastatin 20 milliGRAM(s) Oral at bedtime  gabapentin 200 milliGRAM(s) Oral three times a day  heparin  Injectable 5000 Unit(s) SubCutaneous every 12 hours  ketorolac   Injectable 15 milliGRAM(s) IV Push every 8 hours  melatonin 3 milliGRAM(s) Oral at bedtime  sodium chloride   Oral Tab/Cap - Peds 1 Gram(s) Oral two times a day  sodium chloride 0.9%. 1000 milliLiter(s) (85 mL/Hr) IV Continuous <Continuous>  verapamil 40 milliGRAM(s) Oral every 8 hours    MEDICATIONS  (PRN):  HYDROmorphone  Injectable 0.5 milliGRAM(s) IV Push every 4 hours PRN Severe Pain (7 - 10)  oxyCODONE    IR 5 milliGRAM(s) Oral every 4 hours PRN Moderate Pain (4 - 6)      Vital Signs Last 24 Hrs  T(C): 37 (05 Feb 2020 17:47), Max: 37 (05 Feb 2020 05:13)  T(F): 98.6 (05 Feb 2020 17:47), Max: 98.6 (05 Feb 2020 05:13)  HR: 78 (05 Feb 2020 21:33) (59 - 85)  BP: 108/58 (05 Feb 2020 21:33) (104/55 - 134/78)  BP(mean): --  RR: 18 (05 Feb 2020 17:47) (18 - 20)  SpO2: 97% (05 Feb 2020 17:47) (96% - 97%)  PE:  Gen: nad  ENT: no lad  GI: soft, nd, nt  MSK: no joint swelling. raising of leg very painful in her left hemipelvis                            9.7    17.01 )-----------( 351      ( 05 Feb 2020 06:37 )             29.1     02-05    123<L>  |  89<L>  |  33.0<H>  ----------------------------<  69<L>  5.0   |  21.0<L>  |  1.11    Ca    8.7      05 Feb 2020 06:37    TPro  6.6  /  Alb  3.3  /  TBili  0.8  /  DBili  x   /  AST  52<H>  /  ALT  30  /  AlkPhos  277<H>  02-04

## 2020-02-06 NOTE — CONSULT NOTE ADULT - ASSESSMENT
89 year old female with HTN with recently diagnosed metastatic bladder cancer with lung, liver and bone metastasis.  Presents this admission s/p fall at home.  She had an MRI of her brain which showed a punctate area of restriction diffusion in the right parietal lobe and she has severe spinal stenosis in the lumbar spine and a possible cystic nodule at L3.       PLAN:  NEURO:   -pain control  -L3 nodule, consider gadolinium enhanced MRI of LS sine to better characterize this nodule (if renal function allows)  -Decision for intervention of spinal stenosis would depend on her prognosis form metastatic bladder cancer  -No acute neurosurgical intervention required  -PT/OT  -supportive and palliative care/ further medical management as per primary team  -Will discuss plan with Dr. Blackmon      Assessment:  Please Check When Present   []  GCS  E   V  M     Heart Failure: []Acute, [] acute on chronic , []chronic  Heart Failure:  [] Diastolic (HFpEF), [] Systolic (HFrEF), []Combined (HFpEF and HFrEF), [] RHF, [] Pulm HTN, [] Other    [] TODD, [] ATN, [] AIN, [] other  [] CKD1, [] CKD2, [] CKD 3, [] CKD 4, [] CKD 5, []ESRD    Encephalopathy: [] Metabolic, [] Hepatic, [] toxic, [] Neurological, [] Other    Abnormal Nurtitional Status: [] malnurtition (see nutrition note), [ ]underweight: BMI < 19, [] morbid obesity: BMI >40, [] Cachexia    [] Sepsis  [] hypovolemic shock,[] cardiogenic shock, [] hemorrhagic shock, [] neuogenic shock  [] Acute Respiratory Failure  []Cerebral edema, [] Brain compression/ herniation,   [] Functional quadriplegia  [] Acute blood loss anemia 89 year old female with HTN with recently diagnosed metastatic bladder cancer with lung, liver and bone metastasis.  Presents this admission s/p fall at home.  She had an MRI of her brain which showed a punctate area of restriction diffusion in the right parietal lobe and she has severe spinal stenosis in the lumbar spine and a possible cystic nodule at L3.       PLAN:  NEURO:   -pain control  -L3 nodule, consider gadolinium enhanced MRI of LS sine to better characterize this nodule (if renal function allows)  -Unlikey to be a candidate for intervention for chronic DDD/spinal stenosis given age, medical comorbidities, and history of metastatic bladder cancer with solid organ (liver) involvmenet  -No acute neurosurgical intervention required at this time  -PT/OT  -supportive and palliative care/ further medical management as per primary team  -Will discuss plan with Dr. Blackmon      Assessment:  Please Check When Present   []  GCS  E   V  M     Heart Failure: []Acute, [] acute on chronic , []chronic  Heart Failure:  [] Diastolic (HFpEF), [] Systolic (HFrEF), []Combined (HFpEF and HFrEF), [] RHF, [] Pulm HTN, [] Other    [] TODD, [] ATN, [] AIN, [] other  [] CKD1, [] CKD2, [] CKD 3, [] CKD 4, [] CKD 5, []ESRD    Encephalopathy: [] Metabolic, [] Hepatic, [] toxic, [] Neurological, [] Other    Abnormal Nurtitional Status: [] malnurtition (see nutrition note), [ ]underweight: BMI < 19, [] morbid obesity: BMI >40, [] Cachexia    [] Sepsis  [] hypovolemic shock,[] cardiogenic shock, [] hemorrhagic shock, [] neuogenic shock  [] Acute Respiratory Failure  []Cerebral edema, [] Brain compression/ herniation,   [] Functional quadriplegia  [] Acute blood loss anemia

## 2020-02-06 NOTE — CONSULT NOTE ADULT - PROBLEM SELECTOR RECOMMENDATION 3
As seen on MR Brain  Neurology consulted- visiting patient at time of my consult  No other neurologic deficits except for lower extremity weakness/inability to walk

## 2020-02-06 NOTE — CONSULT NOTE ADULT - SUBJECTIVE AND OBJECTIVE BOX
HPI:   89 yo female with Bladder cancer presented with complaints of lower back pain and hip pain after fall at home . Pt had fallen yesterday at home while walking , her legs were numb and fall backwards . Pt is having pain since yesterday lower back across and both hip area groin , unable  to walk and stand , pain worse on movements . She also reports discomfort  and swelling in lower extremities. She denies any cough , no fever denies any respiratory symptoms , [facility] (2020 16:49)    Urology: Called to evaluate pt for urinary retention.  Pt resting comfortably in bed at this time.  Pt states she fell at home after having "a funny feeling" in her right leg..  Pt states she had mild lower back pain, currently does not complain of any back pain, only some weakness in her right leg.  Pt states she had no difficulties voiding at home prior to admission, she felt an urge to void and was able to urinate.  Pt has had little to no urge to void since admission and has required straight catheterizations.  She was just straight cath'd prior to interviewing the pt, 700ml drained.  Pt is currently being treated for bladder CA with immunotherapy, she doesn't know the name of the medication.     PAST MEDICAL & SURGICAL HISTORY:  HTN (hypertension)  Cancer of bladder  H/O: hysterectomy      acetaminophen   Tablet .. 975 milliGRAM(s) Oral every 8 hours  allopurinol 300 milliGRAM(s) Oral daily  aspirin enteric coated 81 milliGRAM(s) Oral daily  atorvastatin 20 milliGRAM(s) Oral at bedtime  gabapentin 200 milliGRAM(s) Oral three times a day  heparin  Injectable 5000 Unit(s) SubCutaneous every 12 hours  HYDROmorphone  Injectable 0.5 milliGRAM(s) IV Push every 4 hours PRN  melatonin 3 milliGRAM(s) Oral at bedtime  oxyCODONE    IR 5 milliGRAM(s) Oral every 4 hours PRN  polyethylene glycol 3350 17 Gram(s) Oral daily  senna 2 Tablet(s) Oral at bedtime  sodium chloride   Oral Tab/Cap - Peds 1 Gram(s) Oral two times a day  sodium chloride 0.45% 1000 milliLiter(s) IV Continuous <Continuous>  tamsulosin 0.4 milliGRAM(s) Oral at bedtime  verapamil 40 milliGRAM(s) Oral every 8 hours      No Known Allergies      T(C): 36.8 (20 @ 08:26), Max: 37 (20 @ 17:47)  HR: 75 (20 @ 08:26) (75 - 85)  BP: 105/61 (20 @ 08:26) (104/55 - 130/76)  RR: 18 (20 @ 08:26) (18 - 20)  SpO2: 97% (20 @ 08:26) (97% - 98%)      20 @ 07:01  -  20 @ 13:27  --------------------------------------------------------  IN: 0 mL / OUT: 700 mL / NET: -700 mL                              8.9    16.90 )-----------( 342      ( 2020 06:48 )             28.2       02-    126<L>  |  94<L>  |  48.0<H>  ----------------------------<  90  5.7<H>   |  19.0<L>  |  1.84<H>    Ca    8.5<L>      2020 06:48    TPro  5.1<L>  /  Alb  2.5<L>  /  TBili  0.5  /  DBili  x   /  AST  43<H>  /  ALT  24  /  AlkPhos  245<H>  -      Urinalysis Basic - ( 2020 10:01 )    Color: Yellow / Appearance: Clear / S.010 / pH: x  Gluc: x / Ketone: Small  / Bili: Negative / Urobili: Negative mg/dL   Blood: x / Protein: 100 mg/dL / Nitrite: Negative   Leuk Esterase: Trace / RBC: 0-2 /HPF / WBC 3-5   Sq Epi: x / Non Sq Epi: Occasional / Bacteria: Negative      Radiology:< from: MR Lumbar Spine No Cont (20 @ 03:13) >   EXAM:  MR SPINE LUMBAR                          *** ADDENDUM 2020  ***    These findings were discussed with Dr. Lyles of the patient's clinical team on 2020 at 11:15 AM.      *** END OF ADDENDUM 2020  ***      PROCEDURE DATE:2020          INTERPRETATION:  [facility] RADIOLOGIST PRELIMINARY REPORT    PROCEDURE INFORMATION:   Exam: MR Lumbar Spine Without Contrast.   Exam date and time: 2020 2:53 AM   Age: 89 years old   Clinical indication: Weakness     TECHNIQUE:   Imaging protocol: Multiplanar magnetic resonance images of the lumbar spine   without intravenous contrast.     COMPARISON:   No relevant prior studies available.     FINDINGS:   Vertebrae: Modic type 1 changes involving inferior endplate of L2 and superior   endplate of L3.   Spinal cord: Normal signal. No cord compression.   L1-L2:  No significant disc disease. No significant spinal canal stenosis. No   neural foraminal stenosis.   L2-L3: Marked disc desiccation changes at L2-L3 with loss of intervertebral   disc height and a broad central disc bulge resulting in severe stenosis of the   spinal canal. Bilateral facet hypertrophy at L2-L3. Moderate bilateral neural   foraminal stenosis is also noted at this level.   L3-L4: Disc desiccation changes at L3-L4. Combination of broad central disc   bulge, bilateral facet hypertrophy, and buckling of ligamentum flavum results   in severe stenosis of the spinal canal and bilateral neural foramina.  L4-L5: Disc desiccation changes and bilateral facet hypertrophy. Combination of   left paracentral disc bulge and hypertrophic facet results in significant   stenosis of the left neural foramen and recess. Mild stenosis of the spinal   canal and right neural foramen.  L5-S1: Disc desiccation changesand bilateral facet hypertrophy. No significant   spinal canal stenosis. No neural foraminal stenosis.     Soft tissues: Right hydroureteronephrosis. Extensive right external and common   iliac lymphadenopathy. Distended urinary bladder. Fluid in thepresacral space.   Hepatomegaly. Multiple centrally necrotic liver masses highly concerning for   metastatic disease.    IMPRESSION:   Severe stenosis of the spinal canal at L2-L3 and L3-L4 as discussed above.    Hepatomegaly. Multiple centrally necrotic liver masses highly concerning for   metastatic disease.    Right hydroureteronephrosis.    Extensive right external and common iliac lymphadenopathy.      *******************OFFICIAL ATTENDING REPORT*******************    CLINICAL INDICATION: Bladder cancer, evaluate for metastatic disease.    TECHNIQUE: Multiplanar multisequence MRI of the lumbar spine was performed without the administration of intravenous contrast, according to standard protocol.    COMPARISON: None available.    FINDINGS:    ALIGNMENT: Straightening of the expected lumbar lordosis, with minimal retrolisthesis of L2 on L3 and minimal grade 1 anterolisthesis of L4 on L5, findings favored to be degenerative in nature. There is a mild levoconvex curvature to the lower lumbar spine.    VERTEBRAE: The vertebral bodies are normal in height. There is no acute fracture or aggressive osseous lesion.  There is diffuse heterogeneity of the bone marrow, likely reflecting underlying osteopenia.     DISCS: There is multilevel degenerative loss of intervertebral disc space height particularly at L2-L3 and L3-L4 level.    CONUS MEDULLARIS AND CAUDA EQUINA: The conus medullaris terminates at L1-2. There is normal appearance of the conus medullaris.  There is a 3.5 x 4 mm nodule in the right anterior aspect of the thecal sac at the L3 level (series 6, image 24 and series 4, image 9), incompletely evaluated given lack of intravenous contrast. 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    PARAVERTEBRAL SOFT TISSUES AND VISUALIZED RETROPERITONEUM: The visualized paravertebral soft tissues appear within normal limits.    There are multiple T2 hyperintense lesions in the liver the largest measuring 5.6 x 3.7 cm in the inferior aspect of the right hepatic lobe (series 2, image 9), findings compatible with hepatic metastatic disease. There is trace perihepatic ascites.     There is moderate right hydroureteronephrosis. Obstructing soft tissue mass in the bladder compatible with known malignancy, partially visualized.   There is susceptibility blooming artifact in the posterior aspect of the bladder corresponding to metallic density in CT, which may represent postsurgical/postprocedural material such as fiducial marker.    There are multiple pathologically enlarged, metastatic right iliac chain lymph nodes the largest measuring 2.8 x 2.3 cm (series 6, image 52).    Please see CT abdomen and pelvis 2020 for detailed assessment.    EVALUATION OF INDIVIDUAL LEVELS:   L1-2: No spinal canal or neuroforaminal stenosis.    L2-3: Retrolisthesis of L2 on L3. Disc bulge and degenerative changes of the bilateral facet joints, thickening of ligamentum flavum resulting in moderate canal stenosis with mass effect on the descending cauda equina nerve roots, and moderate to severe neuroforaminal narrowing bilaterally.    L3-4: Disc bulge and degenerative changes of the bilateral facet joints, thickening of ligamentum flavum posterior epidural fat resulting in moderate canal stenosis with mass effect on the descending cauda equina nerve roots and moderate neuroforaminal narrowing bilaterally.    L4-5: Minimal grade 1 anterolisthesis of L4 on L5. Disc bulge and degenerative changes of the facet joints, thickening of ligamentum flavum resulting in moderate to moderate to severe canal stenosis and severe left, mild right neural foraminal narrowing.    L5-S1: Mild disc bulge eccentric to the right and degenerative changes of the bilateral facet joints resulting in mild-to-moderate right and mild left neuroforaminal narrowing. No canal stenosis.    LIMITED EVALUATION OF UPPER SACRUM AND SACROILIAC JOINTS: Mild degenerative changes of the sacroiliac joints.      IMPRESSION:     3.5 x 4 mm nodule in the right anterior aspect of the thecal sac at the L3 level, incompletely evaluated given lack of intravenous contrast. 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 Dedicated MRI lumbar spine without contrast is recommended for further assessment.    Hepatic metastatic disease with trace perihepatic ascites.  Moderate right hydroureteronephrosis. Obstructing soft tissue mass in the bladder compatible with known malignancy, partially visualized.   There is susceptibility blooming artifact in the posterior aspect of the bladder corresponding to metallic density in CT, which may represent postsurgical/postprocedural material such as fiducial marker.    Metastatic right iliac chain lymphadenopathy. Please see CT abdomen and pelvis 2020 for detailed assessment.          ***Please see the addendum at the top of this report. It may contain additional important information or changes.****    < end of copied text >

## 2020-02-06 NOTE — CONSULT NOTE ADULT - SUBJECTIVE AND OBJECTIVE BOX
[facility]                                     Neurology at [place]                                 Kellee Lema, & Gui                                  [address] [facility] ([facility]                                        [place], [place], [address]                                             [phone]    CC: stroke on MRI  HPI:  The patient is a 89y Female who presented with LBP and hip pain following a fall at home. She felt a funny sensation in her RIGHT leg and fell. She has history of metastatic bladder cancer and is on immunotherapy. She has no c/o numbness into legs today.  She does have weakness, mostly proximal.  She had an MRI of her brain which showed a punctate area of restriction diffusion in the right parietal lobe and she had severe spinal stenosis in the lumbar spine and a possible cystic nodule at L3.  Neurology is asked to evaluate.    PAST MEDICAL & SURGICAL HISTORY:  HTN (hypertension)  Cancer of bladder  H/O: hysterectomy      MEDICATIONS  (STANDING):  acetaminophen   Tablet .. 975 milliGRAM(s) Oral every 8 hours  allopurinol 300 milliGRAM(s) Oral daily  aspirin enteric coated 81 milliGRAM(s) Oral daily  atorvastatin 20 milliGRAM(s) Oral at bedtime  gabapentin 200 milliGRAM(s) Oral three times a day  heparin  Injectable 5000 Unit(s) SubCutaneous every 12 hours  melatonin 3 milliGRAM(s) Oral at bedtime  polyethylene glycol 3350 17 Gram(s) Oral daily  senna 2 Tablet(s) Oral at bedtime  sodium chloride   Oral Tab/Cap - Peds 1 Gram(s) Oral two times a day  sodium chloride 0.45% 1000 milliLiter(s) (83 mL/Hr) IV Continuous <Continuous>  tamsulosin 0.4 milliGRAM(s) Oral at bedtime  verapamil 40 milliGRAM(s) Oral every 8 hours    MEDICATIONS  (PRN):  HYDROmorphone  Injectable 0.5 milliGRAM(s) IV Push every 4 hours PRN Severe Pain (7 - 10)  oxyCODONE    IR 5 milliGRAM(s) Oral every 4 hours PRN Moderate Pain (4 - 6)      Allergies    No Known Allergies    Intolerances        SOCIAL HISTORY:  no tob,   no alcohol   no drugs    FAMILY HISTORY:  FH: stroke: dad had  at age 69  FH: diabetes mellitus: mother  at age 79        ROS: 14 point ROS negative other than what is present in HPI or below    Vital Signs Last 24 Hrs  T(C): 36.8 (2020 08:26), Max: 37 (2020 17:47)  T(F): 98.2 (2020 08:26), Max: 98.6 (2020 17:47)  HR: 75 (2020 08:26) (75 - 85)  BP: 105/61 (2020 08:26) (104/55 - 130/76)  BP(mean): --  RR: 18 (2020 08:26) (18 - 20)  SpO2: 97% (2020 08:26) (97% - 98%)      General: NAD    Detailed Neurologic Exam:    Mental status: The patient is awake and alert and has normal attention span.  The patient is fully oriented in 3 spheres. The patient is oriented to current events. The patient is able to name objects, follow commands, repeat sentences.    Cranial nerves: Pupils equal and react symmetrically to light. There is no visual field deficit to confrontation. Extraocular motion is full with no nystagmus. There is no ptosis. Facial sensation is intact. Facial musculature is symmetric. Palate elevates symmetrically. Shoulder shrug is normal. Tongue is midline.    Motor: There is normal bulk and tone.  There is no tremor.  Strength is 5/5 in the right arm impaired shoulder movement and 3/5 proximal, 4 to 4+/5 distal leg.   Strength is 5/5 in the left arm impaired shoulder movement and  3/5 proximal, 4 to 4+/5 distal leg.    Sensation: Intact to light touch and pin in 4 extremities    Reflexes: 1+ throughout and plantar responses are equivocal.    Cerebellar: There is no dysmetria on finger to nose testing.    Gait : deferred    LABS:                         8.9    16.90 )-----------( 342      ( 2020 06:48 )             28.2       02-06    126<L>  |  94<L>  |  48.0<H>  ----------------------------<  90  5.7<H>   |  19.0<L>  |  1.84<H>    Ca    8.5<L>      2020 06:48    TPro  5.1<L>  /  Alb  2.5<L>  /  TBili  0.5  /  DBili  x   /  AST  43<H>  /  ALT  24  /  AlkPhos  245<H>  02-06      RADIOLOGY & ADDITIONAL STUDIES (independently reviewed unless otherwise noted):  MRI brain- tiny area of restricted diffusion on DWI, not appreciated on ADC, in distal cortical right parietal lobe, no blood or mass  MRI thoracic spine- no cord compression  MRI lumbar spine- degenerative disease, severe spinal stenosis at L3/4 L4/5, nodule on right L3 area in thecal sac, no clumping of roots

## 2020-02-06 NOTE — CONSULT NOTE ADULT - SUBJECTIVE AND OBJECTIVE BOX
89 year old Female with history of HTN, Bladder Cancer with mets to lung, liver and bone, presents this admission s/p fall with LBP and hip pain.. She felt a funny sensation in her RIGHT leg and fell.  She complains of LE weakness, mostly proximal.  She had an MRI of her brain which showed a punctate area of restriction diffusion in the right parietal lobe and she had severe spinal stenosis in the lumbar spine and a possible cystic nodule at L3.         Vital Signs Last 24 Hrs  T(C): 36.8 (06 Feb 2020 08:26), Max: 37 (05 Feb 2020 17:47)  T(F): 98.2 (06 Feb 2020 08:26), Max: 98.6 (05 Feb 2020 17:47)  HR: 70 (06 Feb 2020 13:43) (70 - 85)  BP: 100/60 (06 Feb 2020 13:43) (100/60 - 130/76)  BP(mean): --  RR: 18 (06 Feb 2020 08:26) (18 - 18)  SpO2: 97% (06 Feb 2020 08:26) (97% - 98%)    PAST MEDICAL & SURGICAL HISTORY:  HTN (hypertension)  Cancer of bladder  H/O: hysterectomy      PHYSICAL EXAM:    Constitutional: No Acute Distress     Neurological: AOx3, Following Commands, Moving all Extremities     Motor exam:          Upper extremity                         Delt     Bicep     Tricep    HG                                                 R         5/5        5/5        5/5       5/5                                               L          5/5        5/5        5/5       5/5          Lower extremity                        HF         KF        KE       DF         PF                                                  R       3/5       4+/5        4+/5       5/5         5/5                                               L        3/5        4+/5       4+/5       5/5          5/5                                                 Sensation: [X] intact to light touch  [] decreased:     +LE edema      LABS:                        8.9    16.90 )-----------( 342      ( 06 Feb 2020 06:48 )             28.2    02-06    126<L>  |  94<L>  |  48.0<H>  ----------------------------<  90  5.7<H>   |  19.0<L>  |  1.84<H>    Ca    8.5<L>      06 Feb 2020 06:48    TPro  5.1<L>  /  Alb  2.5<L>  /  TBili  0.5  /  DBili  x   /  AST  43<H>  /  ALT  24  /  AlkPhos  245<H>  02-06      MEDICATIONS:  Antibiotics:    Neuro:  acetaminophen   Tablet .. 975 milliGRAM(s) Oral every 8 hours  gabapentin 200 milliGRAM(s) Oral three times a day  HYDROmorphone  Injectable 0.5 milliGRAM(s) IV Push every 4 hours PRN Severe Pain (7 - 10)  melatonin 3 milliGRAM(s) Oral at bedtime  oxyCODONE    IR 5 milliGRAM(s) Oral every 4 hours PRN Moderate Pain (4 - 6)    Cardiac:  tamsulosin 0.4 milliGRAM(s) Oral at bedtime  verapamil 40 milliGRAM(s) Oral every 8 hours    Pulm:    GI/:  polyethylene glycol 3350 17 Gram(s) Oral daily  senna 2 Tablet(s) Oral at bedtime    Other:   allopurinol 300 milliGRAM(s) Oral daily  aspirin enteric coated 81 milliGRAM(s) Oral daily  atorvastatin 20 milliGRAM(s) Oral at bedtime  heparin  Injectable 5000 Unit(s) SubCutaneous every 12 hours  sodium chloride   Oral Tab/Cap - Peds 1 Gram(s) Oral two times a day  sodium chloride 0.45% 1000 milliLiter(s) IV Continuous <Continuous>    DIET: [] Regular [] CCD [] Renal [] Puree [] Dysphagia [] Tube Feeds: DASH    IMAGING:      MR Lumbar Spine No Cont (02.06.20 @ 03:13)   3.5 x 4 mm nodule in the right anterior aspect of the thecal sac at the L3 level, incompletely evaluated given lack of intravenous contrast. 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 Dedicated MRI lumbar spine without contrast is recommended for further assessment.    Hepatic metastatic disease with trace perihepatic ascites.  Moderate right hydroureteronephrosis. Obstructing soft tissue mass in the bladder compatible with known malignancy, partially visualized.   There is susceptibility blooming artifact in the posterior aspect of the bladder corresponding to metallic density in CT, which may represent postsurgical/postprocedural material such as fiducial marker.    Metastatic right iliac chain lymphadenopathy.    < end of copied text >      MR Thoracic Spine No Cont (02.06.20 @ 03:12)   Mild chronic compression deformity T6 superior endplate, without retropulsion or canal stenosis.     Small pleural effusions bilaterally, larger on the right side, with subjacent compressive atelectasis. The known pulmonary nodules, right lower lobe mass and bilateral hilar lymphadenopathy concerning for intrathoracic metastatic disease are not fully evaluated, due to technique related limitations.    Several T2 hyperintense lesions in the liver, largest measuring approximately 5.6 cm, incompletely evaluated, concerning for additional metastatic disease.    < end of copied text >      MR Head No Cont (02.06.20 @ 03:12)     Tiny cortically-based acute lacunar infarct in the right parietal lobe. No associated hemorrhage.    < end of copied text >    CT Abdomen and Pelvis No Cont (02.04.20 @ 12:45)   6.2 cm mass along the right bladder wall consistent with bladder cancer which results in moderate right hydronephrosis.    Hepatic metastasis.    Pulmonary nodules, enlarged subcarinal lymph node, and right iliac chain lymphadenopathy, likely metastatic disease.    6 cm pelvic mass with associated destruction of the right inferior pubic ramus, likely a metastasis.    Vague areas of sclerosis in the thoracic spine, possibly metastatic disease; a thoracic spine MRI could be obtained for further evaluation.    2.4 cm peripheral opacity in the right lower lobe; differential includes pneumonia, atelectasis or a pulmonary infarct; a CTA of the chest could be obtained for further evaluation if there are no contraindications to intravenous contrast.    < end of copied text >

## 2020-02-06 NOTE — PROGRESS NOTE ADULT - SUBJECTIVE AND OBJECTIVE BOX
NEPHROLOGY INTERVAL HPI/OVERNIGHT EVENTS:  No new events.    MEDICATIONS  (STANDING):  acetaminophen   Tablet .. 975 milliGRAM(s) Oral every 8 hours  aspirin enteric coated 81 milliGRAM(s) Oral daily  atorvastatin 20 milliGRAM(s) Oral at bedtime  gabapentin 200 milliGRAM(s) Oral three times a day  heparin  Injectable 5000 Unit(s) SubCutaneous every 12 hours  ketorolac   Injectable 15 milliGRAM(s) IV Push every 8 hours  melatonin 3 milliGRAM(s) Oral at bedtime  sodium chloride   Oral Tab/Cap - Peds 1 Gram(s) Oral two times a day  sodium chloride 0.45% 1000 milliLiter(s) (83 mL/Hr) IV Continuous <Continuous>  sodium polystyrene sulfonate Suspension 30 Gram(s) Oral once  verapamil 40 milliGRAM(s) Oral every 8 hours    MEDICATIONS  (PRN):  HYDROmorphone  Injectable 0.5 milliGRAM(s) IV Push every 4 hours PRN Severe Pain (7 - 10)  oxyCODONE    IR 5 milliGRAM(s) Oral every 4 hours PRN Moderate Pain (4 - 6)      Allergies    No Known Allergies            Vital Signs Last 24 Hrs  T(C): 36.4 (2020 23:57), Max: 37 (2020 17:47)  T(F): 97.5 (2020 23:57), Max: 98.6 (2020 17:47)  HR: 80 (2020 07:02) (78 - 85)  BP: 130/76 (2020 07:02) (104/55 - 130/76)  BP(mean): --  RR: 18 (2020 23:57) (18 - 20)  SpO2: 98% (2020 23:57) (97% - 98%)    PHYSICAL EXAM:    GENERAL: Comfortable in bed  HEAD:  wnl  EYES:   ENMT:   NECK: veins flat  NERVOUS SYSTEM: alert, orientwed   CHEST/LUNG: no 02, no wheezes  HEART: RR with systolic murmur  ABDOMEN: soft,  NT  EXTREMITIES: trace leg edema    LYMPH:   SKIN: No rash  : Neg    LABS:                        8.9    16.90 )-----------( 342      ( 2020 06:48 )             28.2     02-06    126<L>  |  94<L>  |  48.0<H>  ----------------------------<  90  5.7<H>   |  19.0<L>  |  1.84<H>    Ca    8.5<L>      2020 06:48    TPro  5.1<L>  /  Alb  2.5<L>  /  TBili  0.5  /  DBili  x   /  AST  43<H>  /  ALT  24  /  AlkPhos  245<H>  02-06      Urinalysis Basic - ( 2020 10:01 )    Color: Yellow / Appearance: Clear / S.010 / pH: x  Gluc: x / Ketone: Small  / Bili: Negative / Urobili: Negative mg/dL   Blood: x / Protein: 100 mg/dL / Nitrite: Negative   Leuk Esterase: Trace / RBC: 0-2 /HPF / WBC 3-5   Sq Epi: x / Non Sq Epi: Occasional / Bacteria: Negative              RADIOLOGY & ADDITIONAL TESTS:

## 2020-02-06 NOTE — PROGRESS NOTE ADULT - ASSESSMENT
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   fluid restriction   renal follow up appreciated , cont NA tablets     3- Leukocytosis - cronic per her oncologist   no sign of infection , asymptomatic no fever   PNA is ruled out     4- Metastatic bladder ca bone , lung and hepatic lesions   oncology consult called     d/w her oncologist     discharge soon to [facility] 88 yo with metastatic bladder cancer with lung , bone lesions per her oncologist Dr Escamilla, admitted post fall with unability to walk and severe intractable pain in the left leg , MR of LS and T spine ordered , + urinary retention and , MR showed hydronephrosis on the right , mark inserted flomax initiated ,  consulted     1- Acute R parietal infarct on MR   neurology consult appreciated   cont aspirin statin   will check lipid profile in am     2- ARF with R hydro and urinary retention   mark insertion per    flomax started concern re due to spine pathology stenosis   waiting neurosurgery input as well     3- Spinal stenosis with findings on L spine concern for cancer mets   neurosurgery consult called   overall prognosis  poor   will d/w oncology the result as well     4-eukocytosis - cronic per her oncologist   no sign of infection , asymptomatic no fever   PNA is ruled out     5-Mtastatic bladder ca bone , lung and hepatic lesions   oncology consult appreciated       discharge to [facility] one insurance auth obtained

## 2020-02-06 NOTE — CONSULT NOTE ADULT - ASSESSMENT
The patient is a 89y Female who is followed by neurology because of incidental stroke on MRI brain    Small stroke seen on MRI brain  This does not correlate with her symptoms, seen on DWI, I did not appreciate it on ADC maps  She is already on ASA and lipitor  She may be hypercoaguable due to cancer  Continue ASA/lipitor, fasting lipids  DVT ppx  swallow eval  PT/OT    Spinal stenosis, cystic nodule at L3  Severe spinal stenosis  possible metastatic disease intrathecally  If it would change her treatment would consider gadolinium enhanced MRI of LS sine to better characterize this nodule  PT for ambulation  Decision for intervention of spinal stenosis would depend on her prognosis form metastatic bladder cancer.    d/w Dr Lyles    will follow with you    Eric Goodson MD PhD   [phone]

## 2020-02-06 NOTE — CONSULT NOTE ADULT - PROBLEM SELECTOR RECOMMENDATION 9
Causing metastasis to Liver and extensive R external common iliac lymhadenopathy  Hydronephrosis (r) < GFR to 24 BUN 48/Cr 1.84  Hyperkalemia

## 2020-02-06 NOTE — CONSULT NOTE ADULT - PROBLEM SELECTOR RECOMMENDATION 2
Likely due to Parietal lacunar infarct>sudden weakness leading to fall  Back and groin pain no longer present-except when trying to stand and walk  Unsteady gait cannot live alone anymore  PT evaluation this morning  Weakness of lower extremities

## 2020-02-06 NOTE — PROGRESS NOTE ADULT - SUBJECTIVE AND OBJECTIVE BOX
Internal Medicine Hospitalist Progress Note    follow up for pain in the left leg and weakness   MR result reviewed with radiologist earlier today , pt is c/o no feeling she has to urinate bladder scan performed retaining so  called in am     labs is reviewed , cr is high today , renal fallow up appreciated         Vital Signs Last 24 Hrs  T(C): 36.8 (06 Feb 2020 08:26), Max: 37 (05 Feb 2020 17:47)  T(F): 98.2 (06 Feb 2020 08:26), Max: 98.6 (05 Feb 2020 17:47)  HR: 75 (06 Feb 2020 08:26) (75 - 85)  BP: 105/61 (06 Feb 2020 08:26) (104/55 - 130/76)  BP(mean): --  RR: 18 (06 Feb 2020 08:26) (18 - 18)  SpO2: 97% (06 Feb 2020 08:26) (97% - 98%)      Constitutional: awake alert no distress     Neck: supple, no JVD     Respiratory: CTA bilateral     Cardiovascular: regular s1 /s2     Gastrointestinal: soft no tenderness , BS positive     Extremities: no pretibial edema     Neuro : AXOX3 , leg weakness MS 4/5     Psychiatric: normal affect , pleasant                               8.9    16.90 )-----------( 342      ( 06 Feb 2020 06:48 )             28.2   02-06    126<L>  |  94<L>  |  48.0<H>  ----------------------------<  90  5.7<H>   |  19.0<L>  |  1.84<H>    Ca    8.5<L>      06 Feb 2020 06:48    TPro  5.1<L>  /  Alb  2.5<L>  /  TBili  0.5  /  DBili  x   /  AST  43<H>  /  ALT  24  /  AlkPhos  245<H>  02-06    Radiology :    < from: MR Head No Cont (02.06.20 @ 03:12) >  IMPRESSION:     Tiny cortically-based acute lacunar infarct in the right parietal lobe. No associated hemorrhage.      ***Please see the addendum at the top of this report. It may contain additional important information or changes.    < end of copied text >  < from: MR Thoracic Spine No Cont (02.06.20 @ 03:12) >    IMPRESSION:     Mild chronic compression deformity T6 superior endplate, without retropulsion or canal stenosis.     Small pleural effusions bilaterally, larger on the right side, with subjacent compressive atelectasis. The known pulmonary nodules, right lower lobe mass and bilateral hilar lymphadenopathy concerning for intrathoracic metastatic disease are not fully evaluated, due to technique related limitations.    Several T2 hyperintense lesions in the liver, largest measuring approximately 5.6 cm, incompletely evaluated, concerning for additional metastatic disease.  Please see dedicated CT chest and abdomen 2/4/2020 for detailed assessment.        < end of copied text >

## 2020-02-06 NOTE — PROGRESS NOTE ADULT - ASSESSMENT
1) Metastatic bladder cancer - she is s/p 1 dose of immunotherapy (pembrolizumab) on 1/28/2020. From an oncology perspective she tolerated her first dose of therapy without difficulty. Unfortunately, her fall injury was quite severe and she is not able to ambulate on her own. She normally uses a walker and is able to ambulate but now she is bedridden due to pain. I told her that she needs assistance at home and it is not safe for her to live at home alone. Other than the pain she is experiencing after the fall she had a decent performance status and was independent.  - continue with pain control, claims to be comfortable.    - not safe for her to live alone, particularly if she is going to continue with immunotherapy. Treatment is palliative.  - ruling out fracture  - follow up with primary oncologist, Dr. Escamilla, as outpatient to discuss when or if to restart immunotherapy.  - palliative care consult  - prognosis poor without treatment but regardless incurable disease.     2) Normocytic anemia - hgb 9.7. Likely secondary to malignancy but also possible nutritional deficiency. Check ferritin, iron saturation. Vitamin B12. Has been low in the recent past.  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

## 2020-02-06 NOTE — CONSULT NOTE ADULT - NEGATIVE GENERAL GENITOURINARY SYMPTOMS
normal urinary frequency/no hematuria/no renal colic/no incontinence/no urinary hesitancy/no dysuria

## 2020-02-06 NOTE — CONSULT NOTE ADULT - ASSESSMENT
89 yo cachectic frail elderly Female with Metastatic Bladder Cancer S/P fall + parietal lacunar infarct

## 2020-02-06 NOTE — CONSULT NOTE ADULT - REASON FOR ADMISSION
groin pain post fall
s/p fall
groin pain post fall
groin pain post fall

## 2020-02-07 LAB
24R-OH-CALCIDIOL SERPL-MCNC: 67.6 NG/ML — SIGNIFICANT CHANGE UP (ref 30–80)
ALBUMIN SERPL ELPH-MCNC: 1.9 G/DL — LOW (ref 3.3–5.2)
ALP SERPL-CCNC: 242 U/L — HIGH (ref 40–120)
ALT FLD-CCNC: 22 U/L — SIGNIFICANT CHANGE UP
ANION GAP SERPL CALC-SCNC: 14 MMOL/L — SIGNIFICANT CHANGE UP (ref 5–17)
AST SERPL-CCNC: 42 U/L — HIGH
BILIRUB SERPL-MCNC: 0.4 MG/DL — SIGNIFICANT CHANGE UP (ref 0.4–2)
BUN SERPL-MCNC: 55 MG/DL — HIGH (ref 8–20)
CALCIUM SERPL-MCNC: 7.4 MG/DL — LOW (ref 8.6–10.2)
CHLORIDE SERPL-SCNC: 90 MMOL/L — LOW (ref 98–107)
CHOLEST SERPL-MCNC: 92 MG/DL — LOW (ref 110–199)
CO2 SERPL-SCNC: 20 MMOL/L — LOW (ref 22–29)
CREAT SERPL-MCNC: 1.79 MG/DL — HIGH (ref 0.5–1.3)
GLUCOSE SERPL-MCNC: 73 MG/DL — SIGNIFICANT CHANGE UP (ref 70–99)
HDLC SERPL-MCNC: 36 MG/DL — LOW
LIPID PNL WITH DIRECT LDL SERPL: 37 MG/DL — SIGNIFICANT CHANGE UP
POTASSIUM SERPL-MCNC: 4.7 MMOL/L — SIGNIFICANT CHANGE UP (ref 3.5–5.3)
POTASSIUM SERPL-SCNC: 4.7 MMOL/L — SIGNIFICANT CHANGE UP (ref 3.5–5.3)
PROT SERPL-MCNC: 4.6 G/DL — LOW (ref 6.6–8.7)
SODIUM SERPL-SCNC: 124 MMOL/L — LOW (ref 135–145)
TOTAL CHOLESTEROL/HDL RATIO MEASUREMENT: 3 RATIO — LOW (ref 3.3–7.1)
TRIGL SERPL-MCNC: 95 MG/DL — SIGNIFICANT CHANGE UP (ref 10–200)

## 2020-02-07 PROCEDURE — 99231 SBSQ HOSP IP/OBS SF/LOW 25: CPT

## 2020-02-07 PROCEDURE — 99232 SBSQ HOSP IP/OBS MODERATE 35: CPT

## 2020-02-07 RX ORDER — SODIUM CHLORIDE 5 G/100ML
1000 INJECTION, SOLUTION INTRAVENOUS
Refills: 0 | Status: DISCONTINUED | OUTPATIENT
Start: 2020-02-07 | End: 2020-02-09

## 2020-02-07 RX ADMIN — ATORVASTATIN CALCIUM 20 MILLIGRAM(S): 80 TABLET, FILM COATED ORAL at 22:08

## 2020-02-07 RX ADMIN — Medication 10 MILLIGRAM(S): at 15:23

## 2020-02-07 RX ADMIN — TAMSULOSIN HYDROCHLORIDE 0.4 MILLIGRAM(S): 0.4 CAPSULE ORAL at 22:09

## 2020-02-07 RX ADMIN — Medication 1 ENEMA: at 16:46

## 2020-02-07 RX ADMIN — Medication 975 MILLIGRAM(S): at 06:08

## 2020-02-07 RX ADMIN — SODIUM CHLORIDE 1 GRAM(S): 9 INJECTION INTRAMUSCULAR; INTRAVENOUS; SUBCUTANEOUS at 06:02

## 2020-02-07 RX ADMIN — SENNA PLUS 2 TABLET(S): 8.6 TABLET ORAL at 22:09

## 2020-02-07 RX ADMIN — GABAPENTIN 200 MILLIGRAM(S): 400 CAPSULE ORAL at 06:02

## 2020-02-07 RX ADMIN — SODIUM CHLORIDE 83 MILLILITER(S): 9 INJECTION, SOLUTION INTRAVENOUS at 16:45

## 2020-02-07 RX ADMIN — GABAPENTIN 200 MILLIGRAM(S): 400 CAPSULE ORAL at 13:23

## 2020-02-07 RX ADMIN — Medication 975 MILLIGRAM(S): at 06:02

## 2020-02-07 RX ADMIN — Medication 300 MILLIGRAM(S): at 13:23

## 2020-02-07 RX ADMIN — SODIUM CHLORIDE 1 GRAM(S): 9 INJECTION INTRAMUSCULAR; INTRAVENOUS; SUBCUTANEOUS at 16:46

## 2020-02-07 RX ADMIN — Medication 81 MILLIGRAM(S): at 13:23

## 2020-02-07 RX ADMIN — ZOLPIDEM TARTRATE 5 MILLIGRAM(S): 10 TABLET ORAL at 22:08

## 2020-02-07 RX ADMIN — Medication 3 MILLIGRAM(S): at 22:09

## 2020-02-07 RX ADMIN — HEPARIN SODIUM 5000 UNIT(S): 5000 INJECTION INTRAVENOUS; SUBCUTANEOUS at 06:02

## 2020-02-07 RX ADMIN — POLYETHYLENE GLYCOL 3350 17 GRAM(S): 17 POWDER, FOR SOLUTION ORAL at 13:23

## 2020-02-07 RX ADMIN — Medication 40 MILLIGRAM(S): at 06:02

## 2020-02-07 RX ADMIN — GABAPENTIN 200 MILLIGRAM(S): 400 CAPSULE ORAL at 22:08

## 2020-02-07 RX ADMIN — SODIUM CHLORIDE 83 MILLILITER(S): 9 INJECTION, SOLUTION INTRAVENOUS at 06:02

## 2020-02-07 NOTE — PROGRESS NOTE ADULT - SUBJECTIVE AND OBJECTIVE BOX
89 year old Female with history of HTN, Bladder Cancer with mets to lung, liver and bone, presents this admission s/p fall with LBP and hip pain.. She felt a funny sensation in her RIGHT leg and fell.  She complains of LE weakness, mostly proximal.  She had an MRI of her brain which showed a punctate area of restriction diffusion in the right parietal lobe and she had severe spinal stenosis in the lumbar spine and a possible cystic nodule at L3.     2/7 pt seen in bed, states she is at her baseline, denied any new/ worsening symptoms.  Awake/alert in bed and participates w PT: dispo to [facility]   onc/palliative following     ICU Vital Signs Last 24 Hrs  T(C): 36.7 (07 Feb 2020 09:18), Max: 36.9 (07 Feb 2020 01:04)  T(F): 98 (07 Feb 2020 09:18), Max: 98.4 (07 Feb 2020 01:04)  HR: 82 (07 Feb 2020 13:22) (79 - 88)  BP: 94/58 (07 Feb 2020 13:22) (94/58 - 129/70)  BP(mean): --  ABP: --  ABP(mean): --  RR: 18 (07 Feb 2020 09:18) (18 - 18)  SpO2: 98% (07 Feb 2020 09:18) (97% - 98%)      PAST MEDICAL & SURGICAL HISTORY:  HTN (hypertension)  Cancer of bladder  H/O: hysterectomy      PHYSICAL EXAM:    Constitutional: No Acute Distress     Neurological: AOx3, Following Commands, Moving all Extremities     Motor exam:          Upper extremity                         Delt     Bicep     Tricep    HG                                                 R         5/5        5/5        5/5       5/5                                               L          5/5        5/5        5/5       5/5          Lower extremity                        HF         KF        KE       DF         PF                                                  R       3/5       4+/5        4+/5       5/5         5/5                                               L        3/5        4+/5       4+/5       5/5          5/5                                                 Sensation: [X] intact to light touch  [] decreased:     +LE edema/ ankles b/l       LABS:                                   8.9    16.90 )-----------( 342      ( 06 Feb 2020 06:48 )             28.2     02-07    124<L>  |  90<L>  |  55.0<H>  ----------------------------<  73  4.7   |  20.0<L>  |  1.79<H>    Ca    7.4<L>      07 Feb 2020 07:54    TPro  4.6<L>  /  Alb  1.9<L>  /  TBili  0.4  /  DBili  x   /  AST  42<H>  /  ALT  22  /  AlkPhos  242<H>  02-07    Lipid Profile in AM (02.07.20 @ 07:54)    Total Cholesterol/HDL Ratio Measurement: 3.0 Ratio    Cholesterol, Serum: 92 mg/dL    Triglycerides, Serum: 95 mg/dL    HDL Cholesterol, Serum: 36: HDL Levels >/= 60 mg/dL are considered beneficial and a "negative" risk  factor.  Effective 08/15/2018: New reference range and interpretive comment. mg/dL    Direct LDL: 37: LDL Cholesterol (mg/dL) --- Interpretive Comment (for adults 18 and over)  Optimal LDL Level may vary based on clinical situation  Below 70                   Ideal for people at very high risk of heart  disease  Below 100                  Ideal for people at risk of heart disease  100 - 129                    [place]  130 - 159                    Borderline high  160 - 189                    High  190 and Above           Very high mg/dL    Thyroid Stimulating Hormone, Serum in AM (02.06.20 @ 06:48)    Thyroid Stimulating Hormone, Serum: 1.11 uIU/mL          MEDICATIONS:  MEDICATIONS  (STANDING):  allopurinol 300 milliGRAM(s) Oral daily  aspirin enteric coated 81 milliGRAM(s) Oral daily  atorvastatin 20 milliGRAM(s) Oral at bedtime  bisacodyl Suppository 10 milliGRAM(s) Rectal once  gabapentin 200 milliGRAM(s) Oral three times a day  melatonin 3 milliGRAM(s) Oral at bedtime  polyethylene glycol 3350 17 Gram(s) Oral daily  senna 2 Tablet(s) Oral at bedtime  sodium chloride   Oral Tab/Cap - Peds 1 Gram(s) Oral two times a day  sodium chloride 0.45% 1000 milliLiter(s) (83 mL/Hr) IV Continuous <Continuous>  tamsulosin 0.4 milliGRAM(s) Oral at bedtime  verapamil 40 milliGRAM(s) Oral every 8 hours  zolpidem 5 milliGRAM(s) Oral at bedtime    MEDICATIONS  (PRN):  HYDROmorphone  Injectable 0.5 milliGRAM(s) IV Push every 4 hours PRN Severe Pain (7 - 10)  oxyCODONE    IR 5 milliGRAM(s) Oral every 4 hours PRN Moderate Pain (4 - 6)  saline laxative (FLEET) Rectal Enema 1 Enema Rectal once PRN persistent constipation      DIET: [] Regular [] CCD [x] Renal/soft [] Puree [] Dysphagia [] Tube Feeds: DASH    IMAGING:      MR Lumbar Spine No Cont (02.06.20 @ 03:13)   3.5 x 4 mm nodule in the right anterior aspect of the thecal sac at the L3 level, incompletely evaluated given lack of intravenous contrast. 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 Dedicated MRI lumbar spine without contrast is recommended for further assessment.    Hepatic metastatic disease with trace perihepatic ascites.  Moderate right hydroureteronephrosis. Obstructing soft tissue mass in the bladder compatible with known malignancy, partially visualized.   There is susceptibility blooming artifact in the posterior aspect of the bladder corresponding to metallic density in CT, which may represent postsurgical/postprocedural material such as fiducial marker.    Metastatic right iliac chain lymphadenopathy.    < end of copied text >      MR Thoracic Spine No Cont (02.06.20 @ 03:12)   Mild chronic compression deformity T6 superior endplate, without retropulsion or canal stenosis.     Small pleural effusions bilaterally, larger on the right side, with subjacent compressive atelectasis. The known pulmonary nodules, right lower lobe mass and bilateral hilar lymphadenopathy concerning for intrathoracic metastatic disease are not fully evaluated, due to technique related limitations.    Several T2 hyperintense lesions in the liver, largest measuring approximately 5.6 cm, incompletely evaluated, concerning for additional metastatic disease.    < end of copied text >      MR Head No Cont (02.06.20 @ 03:12)     Tiny cortically-based acute lacunar infarct in the right parietal lobe. No associated hemorrhage.    < end of copied text >    CT Abdomen and Pelvis No Cont (02.04.20 @ 12:45)   6.2 cm mass along the right bladder wall consistent with bladder cancer which results in moderate right hydronephrosis.    Hepatic metastasis.    Pulmonary nodules, enlarged subcarinal lymph node, and right iliac chain lymphadenopathy, likely metastatic disease.    6 cm pelvic mass with associated destruction of the right inferior pubic ramus, likely a metastasis.    Vague areas of sclerosis in the thoracic spine, possibly metastatic disease; a thoracic spine MRI could be obtained for further evaluation.    2.4 cm peripheral opacity in the right lower lobe; differential includes pneumonia, atelectasis or a pulmonary infarct; a CTA of the chest could be obtained for further evaluation if there are no contraindications to intravenous contrast.    < end of copied text >        time spent w pt/on the floor arranging care: > 25 mins

## 2020-02-07 NOTE — PROGRESS NOTE ADULT - SUBJECTIVE AND OBJECTIVE BOX
Internal Medicine Hospitalist Progress Note    follow up for pain in the left leg and weakness , ARF and bladder cancer   pt is seen in am , spoke to  , oncology team earlier today     no overnight events reported         Vital Signs Last 24 Hrs  T(C): 36.7 (07 Feb 2020 09:18), Max: 36.9 (07 Feb 2020 01:04)  T(F): 98 (07 Feb 2020 09:18), Max: 98.4 (07 Feb 2020 01:04)  HR: 82 (07 Feb 2020 13:22) (79 - 88)  BP: 94/58 (07 Feb 2020 13:22) (94/58 - 129/70)  BP(mean): --  RR: 18 (07 Feb 2020 09:18) (18 - 18)  SpO2: 98% (07 Feb 2020 09:18) (97% - 98%)    Constitutional: awake alert no distress     Respiratory: CTA bilateral     Cardiovascular: regular s1 /s2     Gastrointestinal: soft no tenderness , BS positive     Extremities: no pretibial edema                                            8.9    16.90 )-----------( 342      ( 06 Feb 2020 06:48 )             28.2       02-07    124<L>  |  90<L>  |  55.0<H>  ----------------------------<  73  4.7   |  20.0<L>  |  1.79<H>    Ca    7.4<L>      07 Feb 2020 07:54    TPro  4.6<L>  /  Alb  1.9<L>  /  TBili  0.4  /  DBili  x   /  AST  42<H>  /  ALT  22  /  AlkPhos  242<H>  02-07    Radiology :    < from: MR Head No Cont (02.06.20 @ 03:12) >  IMPRESSION:     Tiny cortically-based acute lacunar infarct in the right parietal lobe. No associated hemorrhage.      ***Please see the addendum at the top of this report. It may contain additional important information or changes.    < end of copied text >  < from: MR Thoracic Spine No Cont (02.06.20 @ 03:12) >    IMPRESSION:     Mild chronic compression deformity T6 superior endplate, without retropulsion or canal stenosis.     Small pleural effusions bilaterally, larger on the right side, with subjacent compressive atelectasis. The known pulmonary nodules, right lower lobe mass and bilateral hilar lymphadenopathy concerning for intrathoracic metastatic disease are not fully evaluated, due to technique related limitations.    Several T2 hyperintense lesions in the liver, largest measuring approximately 5.6 cm, incompletely evaluated, concerning for additional metastatic disease.  Please see dedicated CT chest and abdomen 2/4/2020 for detailed assessment.        < end of copied text >

## 2020-02-07 NOTE — PROGRESS NOTE ADULT - ASSESSMENT
1) Metastatic bladder cancer - she is s/p 1 dose of immunotherapy (pembrolizumab) on 1/28/2020. From an oncology perspective she tolerated her first dose of therapy without difficulty. Unfortunately, her fall injury was quite severe and she is not able to ambulate on her own. She normally uses a walker and is able to ambulate but now she is bedridden due to pain. I told her that she needs assistance at home and it is not safe for her to live at home alone. Other than the pain she is experiencing after the fall she had a decent performance status and was independent.  - continue with pain control, claims to be comfortable.    - no clear evidence of fracture  -  eval since it is not safe for her to live alone, particularly if she is going to continue with immunotherapy. Treatment is palliative.  - follow up with primary oncologist, Dr. Escamilla, as outpatient to discuss when or if to restart immunotherapy.  - palliative care follow up  - prognosis poor without treatment but regardless incurable disease.     2) Normocytic anemia - hgb 8.9. Likely secondary to malignancy but also possible nutritional deficiency.   - Check ferritin, iron saturation. Vitamin B12. Has been low in the recent past.  Transfuse if hgb<7.0    3) [facility] - nephrology on board    4) Urinary retention-for Fol;ey. Possible percutaneous nephrostomy tube placement. I feel that her current oncological situation should not preclude her from undergoing IR intervention. If she is able to start immunotherapy again she could do reasonably well and side effects are usually limited with this agent. Renal issues are a much higher threat to her current condition.     She verbalized understanding and agreed with the plan noted above.

## 2020-02-07 NOTE — PROGRESS NOTE ADULT - SUBJECTIVE AND OBJECTIVE BOX
NEPHROLOGY INTERVAL HPI/OVERNIGHT EVENTS:    Examined  [place]    MEDICATIONS  (STANDING):  allopurinol 300 milliGRAM(s) Oral daily  aspirin enteric coated 81 milliGRAM(s) Oral daily  atorvastatin 20 milliGRAM(s) Oral at bedtime  gabapentin 200 milliGRAM(s) Oral three times a day  melatonin 3 milliGRAM(s) Oral at bedtime  polyethylene glycol 3350 17 Gram(s) Oral daily  senna 2 Tablet(s) Oral at bedtime  sodium chloride   Oral Tab/Cap - Peds 1 Gram(s) Oral two times a day  tamsulosin 0.4 milliGRAM(s) Oral at bedtime  verapamil 40 milliGRAM(s) Oral every 8 hours  zolpidem 5 milliGRAM(s) Oral at bedtime    MEDICATIONS  (PRN):  HYDROmorphone  Injectable 0.5 milliGRAM(s) IV Push every 4 hours PRN Severe Pain (7 - 10)  oxyCODONE    IR 5 milliGRAM(s) Oral every 4 hours PRN Moderate Pain (4 - 6)      Allergies    No Known Allergies    Intolerances        Vital Signs Last 24 Hrs  T(C): 36.9 (07 Feb 2020 16:14), Max: 36.9 (07 Feb 2020 01:04)  T(F): 98.4 (07 Feb 2020 16:14), Max: 98.4 (07 Feb 2020 01:04)  HR: 87 (07 Feb 2020 16:14) (80 - 88)  BP: 102/62 (07 Feb 2020 16:14) (94/58 - 124/80)  BP(mean): --  RR: 18 (07 Feb 2020 16:14) (18 - 18)  SpO2: 96% (07 Feb 2020 16:14) (96% - 98%)  Daily     Daily     PHYSICAL EXAM:    GENERAL: Comfortable in bed  HEAD:  NCAT  EYES: EOMI  NERVOUS SYSTEM: alert  CHEST/LUNG: no 02, no wheezes  HEART: RR with systolic murmur  ABDOMEN: soft,  NT  EXTREMITIES: trace leg edema      LABS:                        8.9    16.90 )-----------( 342      ( 06 Feb 2020 06:48 )             28.2     02-07    124<L>  |  90<L>  |  55.0<H>  ----------------------------<  73  4.7   |  20.0<L>  |  1.79<H>    Ca    7.4<L>      07 Feb 2020 07:54    TPro  4.6<L>  /  Alb  1.9<L>  /  TBili  0.4  /  DBili  x   /  AST  42<H>  /  ALT  22  /  AlkPhos  242<H>  02-07        Vitamin D, 25-Hydroxy: 67.6 ng/mL (02-07 @ 16:47)          RADIOLOGY & ADDITIONAL TESTS:

## 2020-02-07 NOTE — PROGRESS NOTE ADULT - ASSESSMENT
89y Female who is followed by neurology because of incidental stroke on MRI brain following fall at home with BLLE weakness inability to ambulate.    Metastatic Bladder Cancer  Was being treated by Oncologist Dr. Escamilla with Immunotherapy  Ability to continue Immunotherapy treatments after discharge to Nursing    Small stroke seen on MRI brain  This does not correlate with her symptoms, seen on DWI, I did not appreciate it on ADC maps.  She is already on ASA and Lipitor.  LDL: 37  She may be hypercoagulable due to cancer.  DVT prophylaxis.  Mobilize with physical therapy.     Spinal stenosis, cystic nodule at L3  Severe spinal stenosis.  Possible metastatic disease intrathecally  Upon review of Oncology and Palliative care notes, it does not appear that contrast MRI will impact management.   PT for ambulation    Hydronephrosis  voidng ssmall amounts  Plan per . 89y Female who is followed by neurology because of incidental stroke on MRI brain following fall at home with BLLE weakness inability to ambulate.    Metastatic Bladder Cancer  Was being treated by Oncologist Dr. Escamilla with Immunotherapy  Ability to continue Immunotherapy treatments after discharge to Nursing    Small stroke seen on MRI brain As per Neurology  This does not correlate with her symptoms, seen on DWI,   She is already on ASA and Lipitor.  She may be hypercoagulable due to cancer.  DVT prophylaxis.  Mobilize with physical therapy.     Spinal stenosis, cystic nodule at L3  Severe spinal stenosis.  Possible metastatic disease intrathecally  Upon review of Oncology and Palliative care notes, it does not appear that contrast MRI will impact management.   PT for ambulation    Hydronephrosis  voidng small amounts  Plan per .     Constipation  Added Dulcolax suppository and Fleet enema  encourage fluids    [facility]  Patient is thinking about living at SNF-since she cannot live alone any longer.  She accepts she has a poor prognosis, will live out her days with best quality of life.  ROYA completed and appreciated  DNR/DNI

## 2020-02-07 NOTE — PROGRESS NOTE ADULT - ASSESSMENT
89y Female who is followed by neurology because of incidental stroke on MRI brain    Small stroke seen on MRI brain  This does not correlate with her symptoms, seen on DWI, I did not appreciate it on ADC maps.  She is already on ASA and Lipitor.  LDL: 37  She may be hypercoagulable due to cancer.  DVT prophylaxis.  Mobilize with physical therapy.     Spinal stenosis, cystic nodule at L3  Severe spinal stenosis.  Possible metastatic disease intrathecally  Upon review of Oncology and Palliative care notes, it does not appear that contrast MRI will impact management.   PT for ambulation    Hydronephrosis  Plan per .     Palliative care evaluation appreciated.     Case discussed with Dr Lyles.

## 2020-02-07 NOTE — PROGRESS NOTE ADULT - ASSESSMENT
88 yo with metastatic bladder cancer with lung , bone lesions per her oncologist Dr Escamilla, admitted post fall with unability to walk and severe intractable pain in the left leg , MR of LS and T spine ordered , + urinary retention and , MR showed hydronephrosis on the right , mark inserted flomax initiated ,  consulted rec R nephrostomy tube however pt is reluctant     1- Acute R parietal infarct on MR   neurology consult appreciated   cont aspirin statin   will check lipid profile in am     2- ARF probably due to iv contrast  in setting of urinary retention and hydronephrosis   mark inserted  per  , cont flomax   renal follow up appreciated   cont iv fluid bicarb infusion   pt is refusing nephrostomy  insertion at present if cr worsens will discuss again   she wants to consider after discharge discussed with her oncologist     3- Spinal stenosis with findings on L spine concern for cancer mets   neurosurgery consult appreciated   overall prognosis  poor   will d/w oncology the result as well , pt is on immunotherapy for palliative only , if she is better functionally may continue immuno therapy     4-leukocytosis - cronic per her oncologist   no sign of infection , asymptomatic no fever   PNA is ruled out     5-Metastatic bladder ca bone , lung and hepatic lesions   oncology follow up noted

## 2020-02-07 NOTE — PROGRESS NOTE ADULT - ASSESSMENT
88 yo female with bladder CA, non-operable, obstructed right ureter due to tumor, hydronephrosis, OTDD, urinary retention, cauda equina  - pt being seen by palliative care team  - cont mark catheter for urinary retention  - recommend IR to place percutaneous NT for right hydronephrosis  - hem/onc follow up  - monitor renal function

## 2020-02-07 NOTE — PROGRESS NOTE ADULT - ASSESSMENT
HypoNatremia etiology  Will repeat urine osm urine Na  No improvement Serum Na past few days  will give 3 hrs hypertonic saline 1.5 %  After saline completed fluid restrict no more IVF  AM labs    will follow

## 2020-02-07 NOTE — PROGRESS NOTE ADULT - ATTENDING COMMENTS
COUNSELING:    Face to face meeting to discuss Advanced Care Planning - Time Spent ______ Minutes.  See goals of care note.    More than 50% time spent in counseling and coordinating care. __20____ Minutes.     Thank you for the opportunity to assist with the care of this patient.   [facility] [phone]. COUNSELING:    Face to face meeting to discuss Advanced Care Planning - Time Spent ______ Minutes.  See goals of care note.    More than 50% time spent in counseling and coordinating care. __25____ Minutes.     Thank you for the opportunity to assist with the care of this patient.   [facility] [phone].

## 2020-02-07 NOTE — PROGRESS NOTE ADULT - SUBJECTIVE AND OBJECTIVE BOX
INTERVAL HPI/OVERNIGHT EVENTS: 89 yo cachectic Female Patient PMH of Metastatic Bladder Cancer on Immunotherapy with Oncologist  Dr. Escamilla. She was admitted to [facility] after falling at home after experiencing BLLE numbness loss of strength fell backwards away from her walker and was unable to get up  on her own. She continues to have severe weakness and inability to ambulate since her admission. During workup, a small acute parietal lacunar infarct was identified.  Her appetite is fair to poor, eating small meals, no difficulty swallowing.. She denies N/V/D is constipated oral bowel regime has not produced a formed BM.  No CP, palpitations SOB or pain at this time.  Slept well last night.  offers no complaints        89y old  Female who presents with a chief complaint of groin pain post fall (07 Feb 2020 10:39)  PAST MEDICAL & SURGICAL HISTORY:  HTN (hypertension)  Cancer of bladder  H/O: hysterectomy  Present Symptoms:     Dyspnea: 0    Nausea/Vomiting:  No  Anxiety:   No  Depression:  No  Fatigue: Yes   Loss of appetite: Yes     Pain: deneis at present            Character-            Duration-            Effect-            Factors-            Frequency-            Location-            Severity-    Review of Systems: Reviewed                 All others negative    MEDICATIONS  (STANDING):  allopurinol 300 milliGRAM(s) Oral daily  aspirin enteric coated 81 milliGRAM(s) Oral daily  atorvastatin 20 milliGRAM(s) Oral at bedtime  bisacodyl Suppository 10 milliGRAM(s) Rectal once  gabapentin 200 milliGRAM(s) Oral three times a day  melatonin 3 milliGRAM(s) Oral at bedtime  polyethylene glycol 3350 17 Gram(s) Oral daily  senna 2 Tablet(s) Oral at bedtime  sodium chloride   Oral Tab/Cap - Peds 1 Gram(s) Oral two times a day  sodium chloride 0.45% 1000 milliLiter(s) (83 mL/Hr) IV Continuous <Continuous>  tamsulosin 0.4 milliGRAM(s) Oral at bedtime  verapamil 40 milliGRAM(s) Oral every 8 hours  zolpidem 5 milliGRAM(s) Oral at bedtime    MEDICATIONS  (PRN):  HYDROmorphone  Injectable 0.5 milliGRAM(s) IV Push every 4 hours PRN Severe Pain (7 - 10)  oxyCODONE    IR 5 milliGRAM(s) Oral every 4 hours PRN Moderate Pain (4 - 6)  saline laxative (FLEET) Rectal Enema 1 Enema Rectal once PRN persistent constipation      PHYSICAL EXAM:    Vital Signs Last 24 Hrs  T(C): 36.7 (07 Feb 2020 09:18), Max: 36.9 (07 Feb 2020 01:04)  T(F): 98 (07 Feb 2020 09:18), Max: 98.4 (07 Feb 2020 01:04)  HR: 80 (07 Feb 2020 09:18) (70 - 88)  BP: 118/65 (07 Feb 2020 09:18) (100/60 - 129/70)  BP(mean): --  RR: 18 (07 Feb 2020 09:18) (18 - 18)  SpO2: 98% (07 Feb 2020 09:18) (97% - 98%)    General: alert  oriented x __3__                   cachexia  verbally engaging and appropriate    HEENT:  dry mouth      Lungs: comfortable    CV: normal      GI:  distended  tender  no BS            constipation  last BM: Yes- suppository and enema ordered    : normal    oliguria/anuria      MSK: weakness                bedbound/wheelchair bound    Skin: normal  ___  no rash    LABS:                        8.9    16.90 )-----------( 342      ( 06 Feb 2020 06:48 )             28.2     02-07    124<L>  |  90<L>  |  55.0<H>  ----------------------------<  73  4.7   |  20.0<L>  |  1.79<H>    Ca    7.4<L>      07 Feb 2020 07:54    TPro  4.6<L>  /  Alb  1.9<L>  /  TBili  0.4  /  DBili  x   /  AST  42<H>  /  ALT  22  /  AlkPhos  242<H>  02-07    I&O's Summary    06 Feb 2020 07:01  -  07 Feb 2020 07:00  --------------------------------------------------------  IN: 581 mL / OUT: 900 mL / NET: -319 mL    RADIOLOGY & ADDITIONAL STUDIES:    ADVANCE DIRECTIVES:   DNR YES  [facility]   Completed on: 2/5/2020  Living Will  NO   Completed on:

## 2020-02-07 NOTE — PROGRESS NOTE ADULT - ATTENDING COMMENTS
NSGY Attg:    see above    patient seen and examined by PA staff    imaging reviewed    agree with plan as above NSGY Attg:    see above    patient seen and examined     LE 5/5 to my exam    agree plan as above  please recall if patient has MRI w and wo contrast or prn

## 2020-02-07 NOTE — PROGRESS NOTE ADULT - SUBJECTIVE AND OBJECTIVE BOX
Subjective:89yFemale s/p fall, bladder CA, being treated with immunotherapy, urinary retention, right hydronephrosis due to obstructed right ureter, spinal stenosis, cauda equina syndrome, TODD.    Tracy: draining well    Vital Signs Last 24 Hrs  T(C): 36.9 (07 Feb 2020 01:04), Max: 36.9 (07 Feb 2020 01:04)  T(F): 98.4 (07 Feb 2020 01:04), Max: 98.4 (07 Feb 2020 01:04)  HR: 88 (07 Feb 2020 06:00) (70 - 88)  BP: 114/58 (07 Feb 2020 06:00) (100/60 - 129/70)  BP(mean): --  RR: 18 (07 Feb 2020 01:04) (18 - 18)  SpO2: 97% (06 Feb 2020 16:42) (97% - 97%)  I&O's Detail    06 Feb 2020 07:01  -  07 Feb 2020 07:00  --------------------------------------------------------  IN:    sodium chloride 0.45%: 581 mL  Total IN: 581 mL    OUT:    Indwelling Catheter - Urethral: 200 mL    Post-Void Residual per Intermittent Catheterization: 700 mL  Total OUT: 900 mL    Total NET: -319 mL          Labs:                        8.9    16.90 )-----------( 342      ( 06 Feb 2020 06:48 )             28.2     02-07    124<L>  |  90<L>  |  55.0<H>  ----------------------------<  73  4.7   |  20.0<L>  |  1.79<H>    Ca    7.4<L>      07 Feb 2020 07:54    TPro  4.6<L>  /  Alb  1.9<L>  /  TBili  0.4  /  DBili  x   /  AST  42<H>  /  ALT  22  /  AlkPhos  242<H>  02-07

## 2020-02-07 NOTE — PROGRESS NOTE ADULT - SUBJECTIVE AND OBJECTIVE BOX
89 year old female with HTN with recently diagnosed metastatic bladder cancer with lung, liver and bone metastasis (including a sacral mass). She says that she tripped and fell in her home when it was late at night. She injured her hip and was in a lot of pain and was not able to get up on her own. She remained on the floor, unable to reach her phone, for about 7 hours until a friend of hers who possesses a key to her home entered and helped her.  She has a difficult time bending her legs as it causes a lot of pain. She lives alone.       Now developed urinary retention.  Right hydronephrosis. Currently pain is a bit better.   IR placement of percutaneous NT for right hydronephrosis is being discussed.        PAST MEDICAL & SURGICAL HISTORY:  HTN (hypertension)  Cancer of bladder  H/O: hysterectomy      MEDICATIONS  (STANDING):  allopurinol 300 milliGRAM(s) Oral daily  aspirin enteric coated 81 milliGRAM(s) Oral daily  atorvastatin 20 milliGRAM(s) Oral at bedtime  gabapentin 200 milliGRAM(s) Oral three times a day  melatonin 3 milliGRAM(s) Oral at bedtime  polyethylene glycol 3350 17 Gram(s) Oral daily  senna 2 Tablet(s) Oral at bedtime  sodium chloride   Oral Tab/Cap - Peds 1 Gram(s) Oral two times a day  sodium chloride 1.5%. 1000 milliLiter(s) (40 mL/Hr) IV Continuous <Continuous>  tamsulosin 0.4 milliGRAM(s) Oral at bedtime  verapamil 40 milliGRAM(s) Oral every 8 hours  zolpidem 5 milliGRAM(s) Oral at bedtime    MEDICATIONS  (PRN):  HYDROmorphone  Injectable 0.5 milliGRAM(s) IV Push every 4 hours PRN Severe Pain (7 - 10)  oxyCODONE    IR 5 milliGRAM(s) Oral every 4 hours PRN Moderate Pain (4 - 6)      Vital noted  PE:  Gen: nad  ENT: no lad  GI: soft, nd, nt  MSK: no joint swelling. raising of leg very painful in her left hemipelvis                                       8.9    16.90 )-----------( 342      ( 06 Feb 2020 06:48 )             28.2       02-07    124<L>  |  90<L>  |  55.0<H>  ----------------------------<  73  4.7   |  20.0<L>  |  1.79<H>    Ca    7.4<L>      07 Feb 2020 07:54    TPro  4.6<L>  /  Alb  1.9<L>  /  TBili  0.4  /  DBili  x   /  AST  42<H>  /  ALT  22  /  AlkPhos  242<H>  02-07

## 2020-02-07 NOTE — PROGRESS NOTE ADULT - SUBJECTIVE AND OBJECTIVE BOX
[facility]                                        Neurology at [place]                                 Kellee Lema, & Gui                                  [address]                                        [place], [place], [address]                                             [phone]        CC: stroke    HPI:   The patient is a 89y Female who presented with LBP and hip pain following a fall at home. She felt a funny sensation in her RIGHT leg and fell.    She has no c/o numbness into legs.  She does have weakness, mostly proximal.  She had an MRI of her brain which showed a punctate area of restriction diffusion in the right parietal lobe and she had severe spinal stenosis in the lumbar spine and a possible cystic nodule at L3.  She has history of metastatic bladder cancer and is on immunotherapy.    Interim history:  Remains on 6 [place].   No new neurologic complaints.     ROS:   Denies headache or dizziness.  Denies chest pain.  Denies shortness of breath.    MEDICATIONS  (STANDING):  allopurinol 300 milliGRAM(s) Oral daily  aspirin enteric coated 81 milliGRAM(s) Oral daily  atorvastatin 20 milliGRAM(s) Oral at bedtime  gabapentin 200 milliGRAM(s) Oral three times a day  heparin  Injectable 5000 Unit(s) SubCutaneous every 12 hours  melatonin 3 milliGRAM(s) Oral at bedtime  polyethylene glycol 3350 17 Gram(s) Oral daily  senna 2 Tablet(s) Oral at bedtime  sodium chloride   Oral Tab/Cap - Peds 1 Gram(s) Oral two times a day  sodium chloride 0.45% 1000 milliLiter(s) (83 mL/Hr) IV Continuous <Continuous>  tamsulosin 0.4 milliGRAM(s) Oral at bedtime  verapamil 40 milliGRAM(s) Oral every 8 hours  zolpidem 5 milliGRAM(s) Oral at bedtime      Vital Signs Last 24 Hrs  T(C): 36.9 (07 Feb 2020 01:04), Max: 36.9 (07 Feb 2020 01:04)  T(F): 98.4 (07 Feb 2020 01:04), Max: 98.4 (07 Feb 2020 01:04)  HR: 88 (07 Feb 2020 06:00) (70 - 88)  BP: 114/58 (07 Feb 2020 06:00) (100/60 - 129/70)  RR: 18 (07 Feb 2020 01:04) (18 - 18)  SpO2: 97% (06 Feb 2020 16:42) (97% - 97%)    Detailed Neurologic Exam:    Mental status: The patient is awake and alert. There is no aphasia. There is no dysarthria.     Cranial nerves: Pupils equal and react symmetrically to light. There is no visual field deficit to threat. Extraocular motion is full with no nystagmus. There is no ptosis. Facial sensation is intact. Facial musculature is symmetric. Palate elevates symmetrically. Tongue is midline.    Motor: There is normal bulk and tone.  There is no tremor.  Strength grossly 5/5 bilaterally in upper extremities except deltoids that are limited by impaired shoulder movement. Strength is symmetric but slightly weak in the lower extremities proximally more than distally.     Sensation: Grossly intact to light touch and pin.    Reflexes: 1+ throughout and plantar responses are flexor.    Cerebellar: No dysmetria on finger nose testing.    Labs:     02-07    124<L>  |  90<L>  |  55.0<H>  ----------------------------<  73  4.7   |  20.0<L>  |  1.79<H>    Ca    7.4<L>      07 Feb 2020 07:54    TPro  4.6<L>  /  Alb  1.9<L>  /  TBili  0.4  /  DBili  x   /  AST  42<H>  /  ALT  22  /  AlkPhos  242<H>  02-07                            8.9    16.90 )-----------( 342      ( 06 Feb 2020 06:48 )             28.2

## 2020-02-07 NOTE — PROGRESS NOTE ADULT - ASSESSMENT
89 year old female with HTN with recently diagnosed metastatic bladder cancer with lung, liver and bone metastasis.  Presents this admission s/p fall at home.  She had an MRI of her brain which showed a punctate area of restriction diffusion in the right parietal lobe and she has severe spinal stenosis in the lumbar spine and a possible cystic nodule at L3.       PLAN:  NEURO:   -pain control  -L3 nodule, Upon review of Oncology and Palliative care notes, it does not appear that contrast MRI will impact management.   - Unlikey to be a candidate for intervention for chronic DDD/spinal stenosis given age, medical comorbidities, and history of metastatic bladder cancer with solid organ (liver) involvement  -No acute neurosurgical intervention required at this time  - cont w PT/ dispo to [facility]   -supportive and palliative care/ further medical management as per primary team, reconsult as needed   - d/w Dr. Blackmon

## 2020-02-08 LAB
ANION GAP SERPL CALC-SCNC: 16 MMOL/L — SIGNIFICANT CHANGE UP (ref 5–17)
APPEARANCE UR: ABNORMAL
BILIRUB UR-MCNC: NEGATIVE — SIGNIFICANT CHANGE UP
BUN SERPL-MCNC: 60 MG/DL — HIGH (ref 8–20)
CALCIUM SERPL-MCNC: 8 MG/DL — LOW (ref 8.6–10.2)
CHLORIDE SERPL-SCNC: 86 MMOL/L — LOW (ref 98–107)
CO2 SERPL-SCNC: 24 MMOL/L — SIGNIFICANT CHANGE UP (ref 22–29)
COLOR SPEC: YELLOW — SIGNIFICANT CHANGE UP
CREAT SERPL-MCNC: 1.85 MG/DL — HIGH (ref 0.5–1.3)
DIFF PNL FLD: ABNORMAL
EPI CELLS # UR: SIGNIFICANT CHANGE UP
GLUCOSE SERPL-MCNC: 78 MG/DL — SIGNIFICANT CHANGE UP (ref 70–99)
GLUCOSE UR QL: NEGATIVE MG/DL — SIGNIFICANT CHANGE UP
KETONES UR-MCNC: NEGATIVE — SIGNIFICANT CHANGE UP
LEUKOCYTE ESTERASE UR-ACNC: ABNORMAL
NITRITE UR-MCNC: NEGATIVE — SIGNIFICANT CHANGE UP
OSMOLALITY UR: 348 MOSM/KG — SIGNIFICANT CHANGE UP (ref 300–1000)
OSMOLALITY UR: 397 MOSM/KG — SIGNIFICANT CHANGE UP (ref 300–1000)
PH UR: 5 — SIGNIFICANT CHANGE UP (ref 5–8)
POTASSIUM SERPL-MCNC: 4.6 MMOL/L — SIGNIFICANT CHANGE UP (ref 3.5–5.3)
POTASSIUM SERPL-SCNC: 4.6 MMOL/L — SIGNIFICANT CHANGE UP (ref 3.5–5.3)
PROT UR-MCNC: 100 MG/DL
RBC CASTS # UR COMP ASSIST: ABNORMAL /HPF (ref 0–4)
SODIUM SERPL-SCNC: 126 MMOL/L — LOW (ref 135–145)
SODIUM UR-SCNC: 31 MMOL/L — SIGNIFICANT CHANGE UP
SODIUM UR-SCNC: 32 MMOL/L — SIGNIFICANT CHANGE UP
SP GR SPEC: 1.01 — SIGNIFICANT CHANGE UP (ref 1.01–1.02)
UROBILINOGEN FLD QL: NEGATIVE MG/DL — SIGNIFICANT CHANGE UP
WBC UR QL: ABNORMAL

## 2020-02-08 PROCEDURE — 99232 SBSQ HOSP IP/OBS MODERATE 35: CPT

## 2020-02-08 RX ORDER — HEPARIN SODIUM 5000 [USP'U]/ML
5000 INJECTION INTRAVENOUS; SUBCUTANEOUS EVERY 8 HOURS
Refills: 0 | Status: COMPLETED | OUTPATIENT
Start: 2020-02-08 | End: 2020-02-09

## 2020-02-08 RX ORDER — ATORVASTATIN CALCIUM 80 MG/1
1 TABLET, FILM COATED ORAL
Qty: 0 | Refills: 0 | DISCHARGE

## 2020-02-08 RX ORDER — VERAPAMIL HCL 240 MG
0 CAPSULE, EXTENDED RELEASE PELLETS 24 HR ORAL
Qty: 0 | Refills: 0 | DISCHARGE

## 2020-02-08 RX ADMIN — SODIUM CHLORIDE 1 GRAM(S): 9 INJECTION INTRAMUSCULAR; INTRAVENOUS; SUBCUTANEOUS at 17:00

## 2020-02-08 RX ADMIN — SENNA PLUS 2 TABLET(S): 8.6 TABLET ORAL at 22:14

## 2020-02-08 RX ADMIN — GABAPENTIN 200 MILLIGRAM(S): 400 CAPSULE ORAL at 05:10

## 2020-02-08 RX ADMIN — Medication 300 MILLIGRAM(S): at 11:55

## 2020-02-08 RX ADMIN — Medication 3 MILLIGRAM(S): at 22:14

## 2020-02-08 RX ADMIN — TAMSULOSIN HYDROCHLORIDE 0.4 MILLIGRAM(S): 0.4 CAPSULE ORAL at 22:14

## 2020-02-08 RX ADMIN — Medication 81 MILLIGRAM(S): at 11:55

## 2020-02-08 RX ADMIN — HEPARIN SODIUM 5000 UNIT(S): 5000 INJECTION INTRAVENOUS; SUBCUTANEOUS at 22:17

## 2020-02-08 RX ADMIN — HEPARIN SODIUM 5000 UNIT(S): 5000 INJECTION INTRAVENOUS; SUBCUTANEOUS at 14:19

## 2020-02-08 RX ADMIN — SODIUM CHLORIDE 1 GRAM(S): 9 INJECTION INTRAMUSCULAR; INTRAVENOUS; SUBCUTANEOUS at 05:12

## 2020-02-08 RX ADMIN — GABAPENTIN 200 MILLIGRAM(S): 400 CAPSULE ORAL at 22:14

## 2020-02-08 RX ADMIN — ATORVASTATIN CALCIUM 20 MILLIGRAM(S): 80 TABLET, FILM COATED ORAL at 22:14

## 2020-02-08 RX ADMIN — ZOLPIDEM TARTRATE 5 MILLIGRAM(S): 10 TABLET ORAL at 22:14

## 2020-02-08 RX ADMIN — HEPARIN SODIUM 5000 UNIT(S): 5000 INJECTION INTRAVENOUS; SUBCUTANEOUS at 11:55

## 2020-02-08 RX ADMIN — GABAPENTIN 200 MILLIGRAM(S): 400 CAPSULE ORAL at 14:19

## 2020-02-08 RX ADMIN — POLYETHYLENE GLYCOL 3350 17 GRAM(S): 17 POWDER, FOR SOLUTION ORAL at 11:55

## 2020-02-08 NOTE — PROGRESS NOTE ADULT - SUBJECTIVE AND OBJECTIVE BOX
NEPHROLOGY INTERVAL HPI/OVERNIGHT EVENTS:    Examined  Frail    MEDICATIONS  (STANDING):  allopurinol 300 milliGRAM(s) Oral daily  aspirin enteric coated 81 milliGRAM(s) Oral daily  atorvastatin 20 milliGRAM(s) Oral at bedtime  gabapentin 200 milliGRAM(s) Oral three times a day  heparin  Injectable 5000 Unit(s) SubCutaneous every 8 hours  melatonin 3 milliGRAM(s) Oral at bedtime  polyethylene glycol 3350 17 Gram(s) Oral daily  senna 2 Tablet(s) Oral at bedtime  sodium chloride   Oral Tab/Cap - Peds 1 Gram(s) Oral two times a day  sodium chloride 1.5%. 1000 milliLiter(s) (40 mL/Hr) IV Continuous <Continuous>  tamsulosin 0.4 milliGRAM(s) Oral at bedtime  zolpidem 5 milliGRAM(s) Oral at bedtime    MEDICATIONS  (PRN):  HYDROmorphone  Injectable 0.5 milliGRAM(s) IV Push every 4 hours PRN Severe Pain (7 - 10)  oxyCODONE    IR 5 milliGRAM(s) Oral every 4 hours PRN Moderate Pain (4 - 6)      Allergies    No Known Allergies    Intolerances        Vital Signs Last 24 Hrs  T(C): 36.7 (2020 00:55), Max: 36.9 (2020 16:14)  T(F): 98.1 (2020 00:55), Max: 98.4 (2020 16:14)  HR: 94 (2020 00:55) (82 - 94)  BP: 111/61 (2020 00:55) (94/58 - 111/61)  BP(mean): --  RR: 18 (2020 00:55) (18 - 18)  SpO2: 96% (2020 16:14) (96% - 96%)  Daily     Daily     PHYSICAL EXAM:  GENERAL: NAD  HEAD:  NCAT  EYES: EOMI  NERVOUS SYSTEM: alert  CHEST/LUNG: no 02, no wheezes  HEART: RR with systolic murmur  ABDOMEN: soft,  NT  EXTREMITIES: trace leg edema      LABS:        124<L>  |  90<L>  |  55.0<H>  ----------------------------<  73  4.7   |  20.0<L>  |  1.79<H>    Ca    7.4<L>      2020 07:54    TPro  4.6<L>  /  Alb  1.9<L>  /  TBili  0.4  /  DBili  x   /  AST  42<H>  /  ALT  22  /  AlkPhos  242<H>        Urinalysis Basic - ( 2020 05:52 )    Color: Yellow / Appearance: Slightly Turbid / S.015 / pH: x  Gluc: x / Ketone: Negative  / Bili: Negative / Urobili: Negative mg/dL   Blood: x / Protein: 100 mg/dL / Nitrite: Negative   Leuk Esterase: Moderate / RBC: 6-10 /HPF / WBC 11-25   Sq Epi: x / Non Sq Epi: Few / Bacteria: x      Vitamin D, 25-Hydroxy: 67.6 ng/mL ( @ 16:47)          RADIOLOGY & ADDITIONAL TESTS:

## 2020-02-08 NOTE — PROGRESS NOTE ADULT - SUBJECTIVE AND OBJECTIVE BOX
89 year old female with HTN with recently diagnosed metastatic bladder cancer with lung, liver and bone metastasis (including a sacral mass). She says that she tripped and fell in her home when it was late at night. She injured her hip and was in a lot of pain and was not able to get up on her own. She remained on the floor, unable to reach her phone, for about 7 hours until a friend of hers who possesses a key to her home entered and helped her.  She has a difficult time bending her legs as it causes a lot of pain. She lives alone.     Now developed urinary retention.  Right hydronephrosis and IR placement of percutaneous NT for right hydronephrosis was being discussed but the patient refuses as she does not want to have a drain in place at this point in her life. "I'll take the chance".       PAST MEDICAL & SURGICAL HISTORY:  HTN (hypertension)  Cancer of bladder  H/O: hysterectomy    MEDICATIONS  (STANDING):  allopurinol 300 milliGRAM(s) Oral daily  aspirin enteric coated 81 milliGRAM(s) Oral daily  atorvastatin 20 milliGRAM(s) Oral at bedtime  gabapentin 200 milliGRAM(s) Oral three times a day  heparin  Injectable 5000 Unit(s) SubCutaneous every 8 hours  melatonin 3 milliGRAM(s) Oral at bedtime  polyethylene glycol 3350 17 Gram(s) Oral daily  senna 2 Tablet(s) Oral at bedtime  sodium chloride   Oral Tab/Cap - Peds 1 Gram(s) Oral two times a day  sodium chloride 1.5%. 1000 milliLiter(s) (40 mL/Hr) IV Continuous <Continuous>  tamsulosin 0.4 milliGRAM(s) Oral at bedtime  zolpidem 5 milliGRAM(s) Oral at bedtime        ICU Vital Signs Last 24 Hrs  T(C): 36.7 (08 Feb 2020 15:45), Max: 36.7 (08 Feb 2020 00:55)  T(F): 98 (08 Feb 2020 15:45), Max: 98.1 (08 Feb 2020 00:55)  HR: 94 (08 Feb 2020 15:45) (94 - 94)  BP: 112/61 (08 Feb 2020 15:45) (111/61 - 112/61)  BP(mean): --  ABP: --  ABP(mean): --  RR: 18 (08 Feb 2020 15:45) (18 - 18)  SpO2: 91% (08 Feb 2020 15:45) (91% - 91%)  PE:  Gen: nad  ENT: no lad  GI: soft, nd, nt  MSK: no joint swelling. raising of leg very painful in her left hemipelvis    02-08    126<L>  |  86<L>  |  60.0<H>  ----------------------------<  78  4.6   |  24.0  |  1.85<H>    Ca    8.0<L>      08 Feb 2020 10:20    TPro  4.6<L>  /  Alb  1.9<L>  /  TBili  0.4  /  DBili  x   /  AST  42<H>  /  ALT  22  /  AlkPhos  242<H>  02-07

## 2020-02-08 NOTE — PROGRESS NOTE ADULT - ASSESSMENT
1) Metastatic bladder cancer - she is s/p 1 dose of immunotherapy (pembrolizumab) on 1/28/2020. From an oncology perspective she tolerated her first dose of therapy without difficulty. Unfortunately, her fall injury was quite severe and she is not able to ambulate on her own. She normally uses a walker and is able to ambulate but now she is bedridden due to pain.  Prior to the fall she had a decent performance status and was independent which is no longer the case.    - continue with pain control, claims to be comfortable as long as she does not raise her left leg which causes severe pain.  - no clear evidence of fracture  -  muna ongoing  - Treatment is palliative.  - follow up with primary oncologist, Dr. Escamilla, as outpatient to discuss when or if immunotherapy can be restarted.  - palliative care follow up  - prognosis poor without treatment but regardless incurable disease.     2) Normocytic anemia - Likely secondary to malignancy but also possible nutritional deficiency.   - Please check ferritin, iron saturation. Vitamin B12. Has been low in the recent past and may benefit from supplementation  Transfuse if hgb<7.0    3) [facility] - nephrology on board    4) Urinary retention-for Fol;ey. Refuses percutaneous nephrostomy tube placement.      She verbalized understanding and agreed with the plan noted above.

## 2020-02-08 NOTE — PROGRESS NOTE ADULT - SUBJECTIVE AND OBJECTIVE BOX
Internal Medicine Hospitalist Progress Note    follow up for ARF . s/p fall with groin leg pain , bladder cancer   pt is resting in the bed seen earlier , no new complaints   she is not interested in getting R nephrostomy  tube       Vital Signs Last 24 Hrs  T(C): 36.7 (08 Feb 2020 00:55), Max: 36.9 (07 Feb 2020 16:14)  T(F): 98.1 (08 Feb 2020 00:55), Max: 98.4 (07 Feb 2020 16:14)  HR: 94 (08 Feb 2020 00:55) (80 - 94)  BP: 111/61 (08 Feb 2020 00:55) (94/58 - 118/65)  BP(mean): --  RR: 18 (08 Feb 2020 00:55) (18 - 18)  SpO2: 96% (07 Feb 2020 16:14) (96% - 98%)        Constitutional: awake alert no distress     Respiratory: CTA bilateral     Cardiovascular: regular s1 /s2     Gastrointestinal: soft no tenderness , BS positive     Extremities: no pretibial edema                                            8.9    16.90 )-----------( 342      ( 06 Feb 2020 06:48 )             28.2       02-07    124<L>  |  90<L>  |  55.0<H>  ----------------------------<  73  4.7   |  20.0<L>  |  1.79<H>    Ca    7.4<L>      07 Feb 2020 07:54    TPro  4.6<L>  /  Alb  1.9<L>  /  TBili  0.4  /  DBili  x   /  AST  42<H>  /  ALT  22  /  AlkPhos  242<H>  02-07    Radiology :    < from: MR Head No Cont (02.06.20 @ 03:12) >  IMPRESSION:     Tiny cortically-based acute lacunar infarct in the right parietal lobe. No associated hemorrhage.      ***Please see the addendum at the top of this report. It may contain additional important information or changes.    < end of copied text >  < from: MR Thoracic Spine No Cont (02.06.20 @ 03:12) >    IMPRESSION:     Mild chronic compression deformity T6 superior endplate, without retropulsion or canal stenosis.     Small pleural effusions bilaterally, larger on the right side, with subjacent compressive atelectasis. The known pulmonary nodules, right lower lobe mass and bilateral hilar lymphadenopathy concerning for intrathoracic metastatic disease are not fully evaluated, due to technique related limitations.    Several T2 hyperintense lesions in the liver, largest measuring approximately 5.6 cm, incompletely evaluated, concerning for additional metastatic disease.  Please see dedicated CT chest and abdomen 2/4/2020 for detailed assessment.        < end of copied text >

## 2020-02-08 NOTE — PROGRESS NOTE ADULT - ASSESSMENT
HypoNatremia etiology  Will repeat urine osm urine Na  No improvement Serum Na past few days  Gave 3 hrs hypertonic saline 1.5 % yest  W fluid restriction  awaiting AM labs    will follow

## 2020-02-08 NOTE — PROGRESS NOTE ADULT - ASSESSMENT
89 yo with metastatic bladder cancer with lung , bone lesions per her oncologist Dr Escamilla, admitted post fall with unability to walk and severe intractable pain in the left leg , MR of LS and T spine ordered , + urinary retention and , MR showed hydronephrosis on the right , mark inserted flomax initiated ,  consulted rec R nephrostomy tube however pt is refusing , Pt si with acute renal failure day 3 iv fluid ordered closely being monitored ; persistent  hyponatremia 3% NS fluid chalange given       1 ARF probably due to iv contrast  in setting of urinary retention and hydronephrosis   iv fluid given   mark in place for retention     pt is refusing nephrostomy  insertion at present if cr worsens will discuss again   she prefers to think about it after discharge and to discuss with her oncologist first   2- Acute small infarct on MR   neurology input appreciated   cont aspirin and statin     3- Spinal stenosis with findings on L spine concern for cancer mets   neurosurgery consult appreciated ,pt is with metasttaic baldder ca poor prognosis   not surgical candidate   PT ambulate     will d/w oncology the result as well , pt is on immunotherapy for palliative only , if she is better functionally may continue immuno therapy     4-leukocytosis - cronic per her oncologist   no sign of infection , asymptomatic no fever   PNA is ruled out     5-Metastatic bladder ca bone , lung and hepatic lesions   oncology follow up noted   over all prognosis poor   6- Urinary retention   cont mark and flomax   avoid constipation   urology follow up after discharge will be needed     discharge to [facility] once cr is better   need insurance auth

## 2020-02-09 LAB
ANION GAP SERPL CALC-SCNC: 16 MMOL/L — SIGNIFICANT CHANGE UP (ref 5–17)
BUN SERPL-MCNC: 63 MG/DL — HIGH (ref 8–20)
CALCIUM SERPL-MCNC: 7.7 MG/DL — LOW (ref 8.6–10.2)
CHLORIDE SERPL-SCNC: 88 MMOL/L — LOW (ref 98–107)
CO2 SERPL-SCNC: 22 MMOL/L — SIGNIFICANT CHANGE UP (ref 22–29)
CREAT SERPL-MCNC: 1.72 MG/DL — HIGH (ref 0.5–1.3)
GLUCOSE SERPL-MCNC: 93 MG/DL — SIGNIFICANT CHANGE UP (ref 70–99)
HCT VFR BLD CALC: 24.6 % — LOW (ref 34.5–45)
HGB BLD-MCNC: 7.8 G/DL — LOW (ref 11.5–15.5)
MCHC RBC-ENTMCNC: 27.2 PG — SIGNIFICANT CHANGE UP (ref 27–34)
MCHC RBC-ENTMCNC: 31.7 GM/DL — LOW (ref 32–36)
MCV RBC AUTO: 85.7 FL — SIGNIFICANT CHANGE UP (ref 80–100)
PLATELET # BLD AUTO: 336 K/UL — SIGNIFICANT CHANGE UP (ref 150–400)
POTASSIUM SERPL-MCNC: 4.6 MMOL/L — SIGNIFICANT CHANGE UP (ref 3.5–5.3)
POTASSIUM SERPL-SCNC: 4.6 MMOL/L — SIGNIFICANT CHANGE UP (ref 3.5–5.3)
RBC # BLD: 2.87 M/UL — LOW (ref 3.8–5.2)
RBC # FLD: 13.2 % — SIGNIFICANT CHANGE UP (ref 10.3–14.5)
SODIUM SERPL-SCNC: 126 MMOL/L — LOW (ref 135–145)
WBC # BLD: 16.09 K/UL — HIGH (ref 3.8–10.5)
WBC # FLD AUTO: 16.09 K/UL — HIGH (ref 3.8–10.5)

## 2020-02-09 PROCEDURE — 99232 SBSQ HOSP IP/OBS MODERATE 35: CPT

## 2020-02-09 RX ORDER — SODIUM CHLORIDE 5 G/100ML
500 INJECTION, SOLUTION INTRAVENOUS
Refills: 0 | Status: COMPLETED | OUTPATIENT
Start: 2020-02-09 | End: 2020-02-09

## 2020-02-09 RX ADMIN — HEPARIN SODIUM 5000 UNIT(S): 5000 INJECTION INTRAVENOUS; SUBCUTANEOUS at 21:45

## 2020-02-09 RX ADMIN — SENNA PLUS 2 TABLET(S): 8.6 TABLET ORAL at 21:43

## 2020-02-09 RX ADMIN — HEPARIN SODIUM 5000 UNIT(S): 5000 INJECTION INTRAVENOUS; SUBCUTANEOUS at 05:27

## 2020-02-09 RX ADMIN — Medication 3 MILLIGRAM(S): at 21:43

## 2020-02-09 RX ADMIN — ZOLPIDEM TARTRATE 5 MILLIGRAM(S): 10 TABLET ORAL at 21:43

## 2020-02-09 RX ADMIN — TAMSULOSIN HYDROCHLORIDE 0.4 MILLIGRAM(S): 0.4 CAPSULE ORAL at 21:45

## 2020-02-09 RX ADMIN — GABAPENTIN 200 MILLIGRAM(S): 400 CAPSULE ORAL at 10:51

## 2020-02-09 RX ADMIN — SODIUM CHLORIDE 1 GRAM(S): 9 INJECTION INTRAMUSCULAR; INTRAVENOUS; SUBCUTANEOUS at 17:16

## 2020-02-09 RX ADMIN — HEPARIN SODIUM 5000 UNIT(S): 5000 INJECTION INTRAVENOUS; SUBCUTANEOUS at 10:48

## 2020-02-09 RX ADMIN — GABAPENTIN 200 MILLIGRAM(S): 400 CAPSULE ORAL at 21:43

## 2020-02-09 RX ADMIN — POLYETHYLENE GLYCOL 3350 17 GRAM(S): 17 POWDER, FOR SOLUTION ORAL at 10:48

## 2020-02-09 RX ADMIN — GABAPENTIN 200 MILLIGRAM(S): 400 CAPSULE ORAL at 05:27

## 2020-02-09 RX ADMIN — Medication 81 MILLIGRAM(S): at 10:48

## 2020-02-09 RX ADMIN — SODIUM CHLORIDE 40 MILLILITER(S): 5 INJECTION, SOLUTION INTRAVENOUS at 17:17

## 2020-02-09 RX ADMIN — Medication 300 MILLIGRAM(S): at 10:49

## 2020-02-09 RX ADMIN — ATORVASTATIN CALCIUM 20 MILLIGRAM(S): 80 TABLET, FILM COATED ORAL at 21:43

## 2020-02-09 RX ADMIN — SODIUM CHLORIDE 1 GRAM(S): 9 INJECTION INTRAMUSCULAR; INTRAVENOUS; SUBCUTANEOUS at 05:27

## 2020-02-09 NOTE — PROGRESS NOTE ADULT - SUBJECTIVE AND OBJECTIVE BOX
89 year old female with HTN with recently diagnosed metastatic bladder cancer with lung, liver and bone metastasis (including a sacral mass). She says that she tripped and fell in her home when it was late at night. She injured her hip and was in a lot of pain and was not able to get up on her own. She remained on the floor, unable to reach her phone, for about 7 hours until a friend of hers who possesses a key to her home entered and helped her.  She has a difficult time bending her legs as it causes a lot of pain. She lives alone.     Developed urinary retention.  Right hydronephrosis and IR placement of percutaneous NT for right hydronephrosis was being discussed but the patient refuses as she does not want to have a drain in place at this point in her life. "I'll take the chance".   Otherwise, no new events.      PAST MEDICAL & SURGICAL HISTORY:  HTN (hypertension)  Cancer of bladder  H/O: hysterectomy    MEDICATIONS  (STANDING):  allopurinol 300 milliGRAM(s) Oral daily  aspirin enteric coated 81 milliGRAM(s) Oral daily  atorvastatin 20 milliGRAM(s) Oral at bedtime  gabapentin 200 milliGRAM(s) Oral three times a day  heparin  Injectable 5000 Unit(s) SubCutaneous every 8 hours  melatonin 3 milliGRAM(s) Oral at bedtime  polyethylene glycol 3350 17 Gram(s) Oral daily  senna 2 Tablet(s) Oral at bedtime  sodium chloride   Oral Tab/Cap - Peds 1 Gram(s) Oral two times a day  tamsulosin 0.4 milliGRAM(s) Oral at bedtime  zolpidem 5 milliGRAM(s) Oral at bedtime    MEDICATIONS  (PRN):  HYDROmorphone  Injectable 0.5 milliGRAM(s) IV Push every 4 hours PRN Severe Pain (7 - 10)  oxyCODONE    IR 5 milliGRAM(s) Oral every 4 hours PRN Moderate Pain (4 - 6)      Vital Signs Last 24 Hrs  T(C): 36.7 (09 Feb 2020 16:07), Max: 36.9 (08 Feb 2020 23:39)  T(F): 98.1 (09 Feb 2020 16:07), Max: 98.5 (08 Feb 2020 23:39)  HR: 99 (09 Feb 2020 16:07) (99 - 105)  BP: 104/65 (09 Feb 2020 16:07) (104/65 - 116/71)  BP(mean): --  RR: 18 (09 Feb 2020 16:07) (18 - 19)  SpO2: 96% (09 Feb 2020 16:07) (94% - 96%)PE:  Gen: nad  ENT: no lad  GI: soft, nd, nt  MSK: no joint swelling. raising of leg very painful in her left hemipelvis                            7.8    16.09 )-----------( 336      ( 09 Feb 2020 10:50 )             24.6         02-09    126<L>  |  88<L>  |  63.0<H>  ----------------------------<  93  4.6   |  22.0  |  1.72<H>    Ca    7.7<L>      09 Feb 2020 10:50

## 2020-02-09 NOTE — PROGRESS NOTE ADULT - ASSESSMENT
1) Metastatic bladder cancer - she is s/p 1 dose of immunotherapy (pembrolizumab) on 1/28/2020. From an oncology perspective she tolerated her first dose of therapy without difficulty. Unfortunately, her fall injury was quite severe and she is not able to ambulate on her own. She normally uses a walker and is able to ambulate but now she is bedridden due to pain.  Prior to the fall she had a decent performance status and was independent which is no longer the case.    - continue with pain control  - no clear evidence of fracture  -  muna ongoing  - Treatment is palliative.  - follow up with primary oncologist, Dr. Escamilla, as outpatient to discuss when or if immunotherapy can be restarted.  - palliative care follow up  - prognosis poor without treatment but regardless incurable disease.     2) Normocytic anemia - Likely secondary to malignancy but also possible nutritional deficiency.   - Please check ferritin, iron saturation. Vitamin B12. Has been low in the recent past and may benefit from supplementation  Transfuse if hgb<7.0. Today 7.8. No active bleeding.    3) [facility] - nephrology on board    4) Urinary retention-for Fol;ey. Refuses percutaneous nephrostomy tube placement. Urology, nephrology following.

## 2020-02-09 NOTE — PROGRESS NOTE ADULT - SUBJECTIVE AND OBJECTIVE BOX
Internal Medicine Hospitalist Progress Note    follow up for ARF . s/p fall , leg pain   pt is seen in am , constipated , still with  c/o pain in the left leg with movements       Vital Signs Last 24 Hrs  T(C): 36.9 (09 Feb 2020 07:47), Max: 36.9 (08 Feb 2020 23:39)  T(F): 98.4 (09 Feb 2020 07:47), Max: 98.5 (08 Feb 2020 23:39)  HR: 99 (09 Feb 2020 07:47) (94 - 105)  BP: 105/66 (09 Feb 2020 07:47) (105/66 - 116/71)  BP(mean): --  RR: 19 (09 Feb 2020 07:47) (18 - 19)  SpO2: 94% (08 Feb 2020 23:39) (91% - 94%)    Constitutional: awake alert no distress     Respiratory: CTA bilateral     Cardiovascular: regular s1 /s2     Gastrointestinal: soft no tenderness , BS positive     Extremities: no pretibial edema                                     7.8    16.09 )-----------( 336      ( 09 Feb 2020 10:50 )             24.6   02-09    126<L>  |  88<L>  |  63.0<H>  ----------------------------<  93  4.6   |  22.0  |  1.72<H>    Ca    7.7<L>      09 Feb 2020 10:50     MRI of spine      Mild chronic compression deformity T6 superior endplate, without retropulsion or canal stenosis.     Small pleural effusions bilaterally, larger on the right side, with subjacent compressive atelectasis. The known pulmonary nodules, right lower lobe mass and bilateral hilar lymphadenopathy concerning for intrathoracic metastatic disease are not fully evaluated, due to technique related limitations.    Several T2 hyperintense lesions in the liver, largest measuring approximately 5.6 cm, incompletely evaluated, concerning for additional metastatic disease.  Please see dedicated CT chest and abdomen 2/4/2020 for detailed assessment.        < end of copied text >

## 2020-02-09 NOTE — PROGRESS NOTE ADULT - ASSESSMENT
HypoNatremia etiology?  Slight improvement Serum Na past few days but still low  will give 4 hrs saline 1.5 % then stop  After saline completed fluid restrict no more IVF  AM labs    will follow

## 2020-02-09 NOTE — PROGRESS NOTE ADULT - SUBJECTIVE AND OBJECTIVE BOX
NEPHROLOGY INTERVAL HPI/OVERNIGHT EVENTS:    Examined  Frail    MEDICATIONS  (STANDING):  allopurinol 300 milliGRAM(s) Oral daily  aspirin enteric coated 81 milliGRAM(s) Oral daily  atorvastatin 20 milliGRAM(s) Oral at bedtime  gabapentin 200 milliGRAM(s) Oral three times a day  heparin  Injectable 5000 Unit(s) SubCutaneous every 8 hours  melatonin 3 milliGRAM(s) Oral at bedtime  polyethylene glycol 3350 17 Gram(s) Oral daily  senna 2 Tablet(s) Oral at bedtime  sodium chloride   Oral Tab/Cap - Peds 1 Gram(s) Oral two times a day  sodium chloride 1.5%. 500 milliLiter(s) (40 mL/Hr) IV Continuous <Continuous>  tamsulosin 0.4 milliGRAM(s) Oral at bedtime  zolpidem 5 milliGRAM(s) Oral at bedtime    MEDICATIONS  (PRN):  HYDROmorphone  Injectable 0.5 milliGRAM(s) IV Push every 4 hours PRN Severe Pain (7 - 10)  oxyCODONE    IR 5 milliGRAM(s) Oral every 4 hours PRN Moderate Pain (4 - 6)      Allergies    No Known Allergies    Intolerances        Vital Signs Last 24 Hrs  T(C): 36.9 (2020 07:47), Max: 36.9 (2020 23:39)  T(F): 98.4 (2020 07:47), Max: 98.5 (2020 23:39)  HR: 99 (2020 07:47) (94 - 105)  BP: 105/66 (2020 07:47) (105/66 - 116/71)  BP(mean): --  RR: 19 (2020 07:47) (18 - 19)  SpO2: 94% (2020 23:39) (91% - 94%)  Daily     Daily     PHYSICAL EXAM:  GENERAL: Comfortable in bed  HEAD:  NCAT  EYES: EOMI  NERVOUS SYSTEM: alert  CHEST/LUNG: no 02, no wheezes  HEART: RR with systolic murmur  ABDOMEN: soft,  NT  EXTREMITIES: trace leg edema      LABS:                        7.8    16.09 )-----------( 336      ( 2020 10:50 )             24.6     02-08    126<L>  |  86<L>  |  60.0<H>  ----------------------------<  78  4.6   |  24.0  |  1.85<H>    Ca    8.0<L>      2020 10:20        Urinalysis Basic - ( 2020 05:52 )    Color: Yellow / Appearance: Slightly Turbid / S.015 / pH: x  Gluc: x / Ketone: Negative  / Bili: Negative / Urobili: Negative mg/dL   Blood: x / Protein: 100 mg/dL / Nitrite: Negative   Leuk Esterase: Moderate / RBC: 6-10 /HPF / WBC 11-25   Sq Epi: x / Non Sq Epi: Few / Bacteria: x              RADIOLOGY & ADDITIONAL TESTS:

## 2020-02-09 NOTE — PROGRESS NOTE ADULT - ASSESSMENT
88 yo with metastatic bladder cancer with lung , bone lesions per her oncologist Dr Escamilla, admitted post fall with unability to walk and severe intractable pain in the left leg , MR of LS and T spine ordered , + urinary retention and , MR showed hydronephrosis on the right , mark inserted flomax initiated ,  consulted rec R nephrostomy tube however pt is refusing , Pt si with acute renal failure day 3 iv fluid ordered closely being monitored ; persistent  hyponatremia 3% NS fluid chalange given , persistent hyponatremia       1 -ARF with right hydro due to bladder scan   iv fluid given   mark in place for urinary retention   no significant change with iv hydration   cont to monitor     pt is refusing nephrostomy  insertion at present if cr worsens will discuss again   she prefers to think about it after discharge and to discuss with her oncologist first     2- Acute small infarct on MR   neurology input appreciated   cont aspirin and statin     3- Spinal stenosis with findings on L spine , cyst ? concern for cancer mets   neurosurgery consult appreciated ,pt is with metastatic bladder  ca with poor prognosis   not surgical candidate for any procedure and no further work since she has poor overall prognosis   PT ambulate   needs to go to rehab     will d/w oncology the result as well , pt is on immunotherapy for palliative only , if she is better functionally may continue immuno therapy     4-leukocytosis - cronic per her oncologist   no sign of infection , asymptomatic no fever     5-Metastatic bladder ca bone , lung and hepatic lesions   oncology follow up noted   over all prognosis poor     6- Urinary retention   cont mark and flomax   avoid constipation   urology follow up after discharge will be needed   7- constipated cont bowel regimen , enema ordered     discharge to [facility]   needs insurance auth will inform HILARIO in am

## 2020-02-10 ENCOUNTER — TRANSCRIPTION ENCOUNTER (OUTPATIENT)
Age: 85
End: 2020-02-10

## 2020-02-10 VITALS
SYSTOLIC BLOOD PRESSURE: 110 MMHG | TEMPERATURE: 98 F | OXYGEN SATURATION: 98 % | DIASTOLIC BLOOD PRESSURE: 68 MMHG | RESPIRATION RATE: 18 BRPM | HEART RATE: 90 BPM

## 2020-02-10 LAB
ANION GAP SERPL CALC-SCNC: 16 MMOL/L — SIGNIFICANT CHANGE UP (ref 5–17)
BUN SERPL-MCNC: 60 MG/DL — HIGH (ref 8–20)
CALCIUM SERPL-MCNC: 8 MG/DL — LOW (ref 8.6–10.2)
CHLORIDE SERPL-SCNC: 89 MMOL/L — LOW (ref 98–107)
CO2 SERPL-SCNC: 22 MMOL/L — SIGNIFICANT CHANGE UP (ref 22–29)
CREAT SERPL-MCNC: 1.67 MG/DL — HIGH (ref 0.5–1.3)
GLUCOSE SERPL-MCNC: 88 MG/DL — SIGNIFICANT CHANGE UP (ref 70–99)
POTASSIUM SERPL-MCNC: 4.6 MMOL/L — SIGNIFICANT CHANGE UP (ref 3.5–5.3)
POTASSIUM SERPL-SCNC: 4.6 MMOL/L — SIGNIFICANT CHANGE UP (ref 3.5–5.3)
SODIUM SERPL-SCNC: 127 MMOL/L — LOW (ref 135–145)

## 2020-02-10 PROCEDURE — 36415 COLL VENOUS BLD VENIPUNCTURE: CPT

## 2020-02-10 PROCEDURE — 70450 CT HEAD/BRAIN W/O DYE: CPT

## 2020-02-10 PROCEDURE — 97110 THERAPEUTIC EXERCISES: CPT

## 2020-02-10 PROCEDURE — 71250 CT THORAX DX C-: CPT

## 2020-02-10 PROCEDURE — 80053 COMPREHEN METABOLIC PANEL: CPT

## 2020-02-10 PROCEDURE — 73502 X-RAY EXAM HIP UNI 2-3 VIEWS: CPT

## 2020-02-10 PROCEDURE — 72125 CT NECK SPINE W/O DYE: CPT

## 2020-02-10 PROCEDURE — 74176 CT ABD & PELVIS W/O CONTRAST: CPT

## 2020-02-10 PROCEDURE — 90471 IMMUNIZATION ADMIN: CPT

## 2020-02-10 PROCEDURE — 97116 GAIT TRAINING THERAPY: CPT

## 2020-02-10 PROCEDURE — 86900 BLOOD TYPING SEROLOGIC ABO: CPT

## 2020-02-10 PROCEDURE — 84550 ASSAY OF BLOOD/URIC ACID: CPT

## 2020-02-10 PROCEDURE — 86901 BLOOD TYPING SEROLOGIC RH(D): CPT

## 2020-02-10 PROCEDURE — 83935 ASSAY OF URINE OSMOLALITY: CPT

## 2020-02-10 PROCEDURE — 96374 THER/PROPH/DIAG INJ IV PUSH: CPT | Mod: XU

## 2020-02-10 PROCEDURE — 99232 SBSQ HOSP IP/OBS MODERATE 35: CPT

## 2020-02-10 PROCEDURE — 72146 MRI CHEST SPINE W/O DYE: CPT

## 2020-02-10 PROCEDURE — 82306 VITAMIN D 25 HYDROXY: CPT

## 2020-02-10 PROCEDURE — 73080 X-RAY EXAM OF ELBOW: CPT

## 2020-02-10 PROCEDURE — 83930 ASSAY OF BLOOD OSMOLALITY: CPT

## 2020-02-10 PROCEDURE — 81001 URINALYSIS AUTO W/SCOPE: CPT

## 2020-02-10 PROCEDURE — 84300 ASSAY OF URINE SODIUM: CPT

## 2020-02-10 PROCEDURE — 80048 BASIC METABOLIC PNL TOTAL CA: CPT

## 2020-02-10 PROCEDURE — 84484 ASSAY OF TROPONIN QUANT: CPT

## 2020-02-10 PROCEDURE — 99285 EMERGENCY DEPT VISIT HI MDM: CPT | Mod: 25

## 2020-02-10 PROCEDURE — 70551 MRI BRAIN STEM W/O DYE: CPT

## 2020-02-10 PROCEDURE — 80061 LIPID PANEL: CPT

## 2020-02-10 PROCEDURE — 71275 CT ANGIOGRAPHY CHEST: CPT

## 2020-02-10 PROCEDURE — 86850 RBC ANTIBODY SCREEN: CPT

## 2020-02-10 PROCEDURE — 97163 PT EVAL HIGH COMPLEX 45 MIN: CPT

## 2020-02-10 PROCEDURE — 72148 MRI LUMBAR SPINE W/O DYE: CPT

## 2020-02-10 PROCEDURE — 72170 X-RAY EXAM OF PELVIS: CPT

## 2020-02-10 PROCEDURE — 83880 ASSAY OF NATRIURETIC PEPTIDE: CPT

## 2020-02-10 PROCEDURE — 90715 TDAP VACCINE 7 YRS/> IM: CPT

## 2020-02-10 PROCEDURE — 93005 ELECTROCARDIOGRAM TRACING: CPT

## 2020-02-10 PROCEDURE — 73090 X-RAY EXAM OF FOREARM: CPT

## 2020-02-10 PROCEDURE — 93970 EXTREMITY STUDY: CPT

## 2020-02-10 PROCEDURE — 83690 ASSAY OF LIPASE: CPT

## 2020-02-10 PROCEDURE — 84443 ASSAY THYROID STIM HORMONE: CPT

## 2020-02-10 PROCEDURE — 85027 COMPLETE CBC AUTOMATED: CPT

## 2020-02-10 PROCEDURE — 99239 HOSP IP/OBS DSCHRG MGMT >30: CPT

## 2020-02-10 RX ORDER — TAMSULOSIN HYDROCHLORIDE 0.4 MG/1
1 CAPSULE ORAL
Qty: 0 | Refills: 0 | DISCHARGE
Start: 2020-02-10

## 2020-02-10 RX ORDER — LANOLIN ALCOHOL/MO/W.PET/CERES
1 CREAM (GRAM) TOPICAL
Qty: 0 | Refills: 0 | DISCHARGE
Start: 2020-02-10

## 2020-02-10 RX ORDER — GABAPENTIN 400 MG/1
2 CAPSULE ORAL
Qty: 0 | Refills: 0 | DISCHARGE
Start: 2020-02-10

## 2020-02-10 RX ORDER — SENNA PLUS 8.6 MG/1
2 TABLET ORAL
Qty: 0 | Refills: 0 | DISCHARGE
Start: 2020-02-10

## 2020-02-10 RX ORDER — ZOLPIDEM TARTRATE 10 MG/1
1 TABLET ORAL
Qty: 0 | Refills: 0 | DISCHARGE
Start: 2020-02-10

## 2020-02-10 RX ORDER — HEPARIN SODIUM 5000 [USP'U]/ML
5000 INJECTION INTRAVENOUS; SUBCUTANEOUS EVERY 8 HOURS
Refills: 0 | Status: DISCONTINUED | OUTPATIENT
Start: 2020-02-10 | End: 2020-02-10

## 2020-02-10 RX ORDER — OXYCODONE HYDROCHLORIDE 5 MG/1
1 TABLET ORAL
Qty: 0 | Refills: 0 | DISCHARGE
Start: 2020-02-10

## 2020-02-10 RX ORDER — POLYETHYLENE GLYCOL 3350 17 G/17G
17 POWDER, FOR SOLUTION ORAL
Qty: 0 | Refills: 0 | DISCHARGE
Start: 2020-02-10

## 2020-02-10 RX ORDER — VERAPAMIL HCL 240 MG
1 CAPSULE, EXTENDED RELEASE PELLETS 24 HR ORAL
Qty: 0 | Refills: 0 | DISCHARGE

## 2020-02-10 RX ORDER — SODIUM CHLORIDE 9 MG/ML
1 INJECTION INTRAMUSCULAR; INTRAVENOUS; SUBCUTANEOUS THREE TIMES A DAY
Refills: 0 | Status: DISCONTINUED | OUTPATIENT
Start: 2020-02-10 | End: 2020-02-10

## 2020-02-10 RX ORDER — SODIUM CHLORIDE 9 MG/ML
1 INJECTION INTRAMUSCULAR; INTRAVENOUS; SUBCUTANEOUS
Qty: 0 | Refills: 0 | DISCHARGE
Start: 2020-02-10

## 2020-02-10 RX ORDER — VERAPAMIL HCL 240 MG
1 CAPSULE, EXTENDED RELEASE PELLETS 24 HR ORAL
Qty: 0 | Refills: 0 | DISCHARGE
Start: 2020-02-10

## 2020-02-10 RX ORDER — ALLOPURINOL 300 MG
1 TABLET ORAL
Qty: 0 | Refills: 0 | DISCHARGE
Start: 2020-02-10

## 2020-02-10 RX ORDER — ASPIRIN/CALCIUM CARB/MAGNESIUM 324 MG
1 TABLET ORAL
Qty: 0 | Refills: 0 | DISCHARGE
Start: 2020-02-10

## 2020-02-10 RX ADMIN — Medication 300 MILLIGRAM(S): at 11:39

## 2020-02-10 RX ADMIN — GABAPENTIN 200 MILLIGRAM(S): 400 CAPSULE ORAL at 13:16

## 2020-02-10 RX ADMIN — HEPARIN SODIUM 5000 UNIT(S): 5000 INJECTION INTRAVENOUS; SUBCUTANEOUS at 13:15

## 2020-02-10 RX ADMIN — OXYCODONE HYDROCHLORIDE 5 MILLIGRAM(S): 5 TABLET ORAL at 16:05

## 2020-02-10 RX ADMIN — GABAPENTIN 200 MILLIGRAM(S): 400 CAPSULE ORAL at 06:06

## 2020-02-10 RX ADMIN — Medication 81 MILLIGRAM(S): at 11:39

## 2020-02-10 RX ADMIN — SODIUM CHLORIDE 1 GRAM(S): 9 INJECTION INTRAMUSCULAR; INTRAVENOUS; SUBCUTANEOUS at 06:06

## 2020-02-10 RX ADMIN — POLYETHYLENE GLYCOL 3350 17 GRAM(S): 17 POWDER, FOR SOLUTION ORAL at 11:39

## 2020-02-10 RX ADMIN — OXYCODONE HYDROCHLORIDE 5 MILLIGRAM(S): 5 TABLET ORAL at 16:47

## 2020-02-10 RX ADMIN — HEPARIN SODIUM 5000 UNIT(S): 5000 INJECTION INTRAVENOUS; SUBCUTANEOUS at 09:42

## 2020-02-10 NOTE — PROGRESS NOTE ADULT - ASSESSMENT
89 yo with metastatic bladder cancer with lung , bone lesions per her oncologist Dr Escamilla, admitted post fall with unability to walk and severe intractable pain in the left leg , MR of LS and T spine ordered , + urinary retention and , MR showed hydronephrosis on the right , mark inserted flomax initiated ,  consulted rec R nephrostomy tube however pt is refusing , Pt is with acute renal failure day 3 iv fluid ordered closely being monitored ; persistent  hyponatremia 3% NS fluid chalange given , persistent hyponatremia         Bladder Ca  5-Metastatic bladder ca bone , lung and hepatic lesions   oncology follow up noted   Will not be pursuing any curative treatment with Oncology  Fell at home-due to sudden BLLE weakness  Found to have an acute Lacunar infarct  over all prognosis poor    1 -Acute renal failure   Right hydro due to bladder scan   iv fluid given   mark in place for urinary retention   no significant change with iv hydration   cont to monitor   2 Problem/Plan   Urine Retention  L Hydronephrosis due to obstruction from bladder tumor burden  Mark catheter placed  Continue Flomax  Draining small amount of concentrated madeleine urine.  Nephrology recommending to place a Nephrostomy tube, patient refusing  Avoid constipation-dulcolax suppository  Urology follow up after discharge will be needed   Denies pain.  Plan-to leave Mark catheter in place for drainage    3- Acute small Lacunar infarct on MR   neurology input appreciated   cont aspirin and statin       3- Spinal stenosis with findings on L spine, concern for cancer mets   Neurosurgery consult appreciated ,Pt is with metastatic bladder  ca with poor prognosis   not surgical candidate for any procedure and no further work since she has poor overall prognosis     4. Unsteady gait- BLLE weakness  unable to ambulate  PT ambulate   needs to go to rehab      Plan:  Patient  is on immunotherapy for palliative only , if she is better functionally may continue immuno therapy   as per Oncology  Patient lives alone, understands she cannot live alone anymore.  She is considering [facility] with transition to LTC; will be entitled to more individualized care if she accepts Hospice support 88 yo with metastatic bladder cancer with lung , bone lesions per her oncologist Dr Escamilla, admitted post fall with unability to walk and severe intractable pain in the left leg , MR of LS and T spine ordered , + urinary retention and , MR showed hydronephrosis on the right , mark inserted flomax initiated ,  consulted rec R nephrostomy tube however pt is refusing , Pt is with acute renal failure day 3 iv fluid ordered closely being monitored ; persistent  hyponatremia 3% NS fluid chalange given , persistent hyponatremia         Bladder Ca  5-Metastatic bladder ca bone , lung and hepatic lesions   oncology follow up noted   Will not be pursuing any curative treatment with Oncology  Fell at home-due to sudden BLLE weakness  Found to have an acute Lacunar infarct  over all prognosis poor    1 -Acute renal failure   Right hydro due to bladder scan   iv fluid given   mark in place for urinary retention   no significant change with iv hydration   cont to monitor   2 Problem/Plan   Urine Retention  L Hydronephrosis due to obstruction from bladder tumor burden  Mark catheter placed  Continue Flomax  Draining small amount of concentrated madeleine urine.  Nephrology recommending to place a Nephrostomy tube, patient refusing  Avoid constipation-dulcolax suppository  Urology follow up after discharge will be needed   Denies pain.  Plan-to leave Mark catheter in place for drainage    3- Acute small Lacunar infarct on MR   neurology input appreciated   cont aspirin and statin       3- Spinal stenosis with findings on L spine, concern for cancer mets   Neurosurgery consult appreciated ,Pt is with metastatic bladder  ca with poor prognosis   not surgical candidate for any procedure and no further work since she has poor overall prognosis     4. Unsteady gait- BLLE weakness  unable to ambulate  PT ambulate   needs to go to rehab      GOC  Plan:  Patient  is on immunotherapy for palliative only , if she is better functionally may continue immuno therapy   as per Oncology.  How she will get to their office once discharged to [facility] and LTC-difficulty ambulating; remains to be seen  Patient lives alone, understands she cannot live alone anymore.  She is considering [facility] with transition to LTC; will be entitled to more individualized care if she accepts Hospice support.  Will contact Hospice to add her name to [facility], which can help provide hospice services if unable to make any  signifcant progress in [facility] DNR/DNI completed this hospitalization

## 2020-02-10 NOTE — CHART NOTE - NSCHARTNOTEFT_GEN_A_CORE
pt with bladder CA, mets  s/p fall, found to have lumbar stenosis, urinary retention, right hydronephrosis due to obstructing bladder CA   pt refusing right nephrostomy tube at this time  Tracy cath draining well  Recommend cont Tracy cath  PT  d/c planning to [facility]  cont present care as per primary team  no urological intervention required at this time

## 2020-02-10 NOTE — PROGRESS NOTE ADULT - REASON FOR ADMISSION
groin pain post fall

## 2020-02-10 NOTE — PROGRESS NOTE ADULT - ATTENDING COMMENTS
COUNSELING:    Face to face meeting to discuss Advanced Care Planning - Time Spent ______ Minutes.  See goals of care note.    More than 50% time spent in counseling and coordinating care. _20_____ Minutes.     Thank you for the opportunity to assist with the care of this patient.   [facility] [phone].

## 2020-02-10 NOTE — PROGRESS NOTE ADULT - SUBJECTIVE AND OBJECTIVE BOX
INTERVAL HPI/OVERNIGHT EVENTS: 89 yo Female Patient PMH of Bladder Ca with Metastasis. Fell at home while ambulating with her walker.  She describes a sudden weakness in BLLE's and fell backwards-couldn't get up on her own-lives alone. MR brain reveals acute lacunar infarct.  F/U note  Patient has been able to stand at bedside, not able to  her legs to move forward. She has urinary retention, Mark catheter placed  draining cloudy madeleine urine limited quantity. She is refusing to have a Nephrostomy tube placed. Appetite fair, feels weak and sleeping at intervals during the day; easily arouseable.  +BM yesterday, Denies pain, N/V/D CP, palpitations SOB, dizziness    89y old  Female who presents with a chief complaint of groin pain post fall (09 Feb 2020 18:10)    PAST MEDICAL & SURGICAL HISTORY:  HTN (hypertension)  Cancer of bladder  H/O: hysterectomy    Present Symptoms:   Dyspnea: 0   Nausea/Vomiting: No  Anxiety:   No  Depression:  No  Fatigue: Yes   Loss of appetite: Yes     Pain: denies            Character-            Duration-            Effect-            Factors-            Frequency-            Location-            Severity-    Review of Systems: Reviewed                      All others negative    MEDICATIONS  (STANDING):  allopurinol 300 milliGRAM(s) Oral daily  aspirin enteric coated 81 milliGRAM(s) Oral daily  atorvastatin 20 milliGRAM(s) Oral at bedtime  gabapentin 200 milliGRAM(s) Oral three times a day  heparin  Injectable 5000 Unit(s) SubCutaneous every 8 hours  melatonin 3 milliGRAM(s) Oral at bedtime  polyethylene glycol 3350 17 Gram(s) Oral daily  senna 2 Tablet(s) Oral at bedtime  sodium chloride   Oral Tab/Cap - Peds 1 Gram(s) Oral two times a day  tamsulosin 0.4 milliGRAM(s) Oral at bedtime  zolpidem 5 milliGRAM(s) Oral at bedtime    MEDICATIONS  (PRN):  HYDROmorphone  Injectable 0.5 milliGRAM(s) IV Push every 4 hours PRN Severe Pain (7 - 10)  oxyCODONE    IR 5 milliGRAM(s) Oral every 4 hours PRN Moderate Pain (4 - 6)      PHYSICAL EXAM:    Vital Signs Last 24 Hrs  T(C): 37.1 (10 Feb 2020 07:24), Max: 37.1 (10 Feb 2020 07:24)  T(F): 98.8 (10 Feb 2020 07:24), Max: 98.8 (10 Feb 2020 07:24)  HR: 91 (10 Feb 2020 07:24) (91 - 99)  BP: 101/61 (10 Feb 2020 07:24) (101/61 - 113/66)  BP(mean): --  RR: 18 (10 Feb 2020 07:24) (18 - 18)  SpO2: 91% (10 Feb 2020 07:24) (91% - 96%)    General: alert  oriented x 3____                  cachexia      HEENT: normal  dry mouth      Lungs: comfortable     CV: normal      GI: normal  distended             constipation  last BM: yesterday    : retention  mark    MSK: weakness  edema BLLE's 3+             bedbound/wheelchair bound    Skin: normal  _  no rash    LABS:                        7.8    16.09 )-----------( 336      ( 09 Feb 2020 10:50 )             24.6     02-10    127<L>  |  89<L>  |  60.0<H>  ----------------------------<  88  4.6   |  22.0  |  1.67<H>    Ca    8.0<L>      10 Feb 2020 07:02    I &O's Summary    09 Feb 2020 07:01  -  10 Feb 2020 07:00  --------------------------------------------------------  IN: 0 mL / OUT: 900 mL / NET: -900 mL    RADIOLOGY & ADDITIONAL STUDIES:    ADVANCE DIRECTIVES:   DNR YES   Completed on:                     MOLST  YES    Completed on:  Living Will  YES    Completed on:

## 2020-02-10 NOTE — DISCHARGE NOTE NURSING/CASE MANAGEMENT/SOCIAL WORK - PATIENT PORTAL LINK FT
You can access the FollowMyHealth Patient Portal offered by [facility] by registering at the following website: [email or web address]. By joining [facility]’s FollowMyHealth portal, you will also be able to view your health information using other applications (apps) compatible with our system.

## 2020-02-10 NOTE — DISCHARGE NOTE PROVIDER - CARE PROVIDERS DIRECT ADDRESSES
,DirectAddress_Unknown,[email or web address],DirectAddress_Unknown,DirectAddress_Unknown

## 2020-02-10 NOTE — DISCHARGE NOTE PROVIDER - CARE PROVIDER_API CALL
DR Escamilla,   Phone: (   )    -  Fax: (   )    -  Follow Up Time:     Jewel Goldsmith)  Urology  [address], [address]  [place] [address]  Phone: [phone]  Fax: [phone]  Follow Up Time: 2 weeks Jewel Goldsmith)  Urology  [address], [address]  [place] [address]  Phone: [phone]  Fax: [phone]  Follow Up Time: 2 weeks    DR Escamilla,   Phone: (   )    -  Fax: (   )    -  Follow Up Time:     Osbaldo Leon (DO)  Internal Medicine  [address], [place] [address]  Phone: [phone]  Fax: [phone]  Follow Up Time: 1 week

## 2020-02-10 NOTE — PROGRESS NOTE ADULT - ASSESSMENT
HypoNatremia etiology?  Slight improvement Serum Na past few days but still low  s/p 4 hrs saline 1.5 % yest  fluid restrict no more IVF  Will inc NaCL tabs to TID  AM labs    will follow

## 2020-02-10 NOTE — DISCHARGE NOTE PROVIDER - NSDCMRMEDTOKEN_GEN_ALL_CORE_FT
allopurinol 300 mg oral tablet: 1 tab(s) orally once a day  aspirin 81 mg oral delayed release tablet: 1 tab(s) orally once a day  atorvastatin: 1  orally once a day  gabapentin 100 mg oral capsule: 2 cap(s) orally 3 times a day  melatonin 3 mg oral tablet: 1 tab(s) orally once a day (at bedtime)  oxyCODONE 5 mg oral tablet: 1 tab(s) orally every 4 hours, As needed, Moderate Pain (4 - 6)  polyethylene glycol 3350 oral powder for reconstitution: 17 gram(s) orally once a day  senna oral tablet: 2 tab(s) orally once a day (at bedtime)  sodium chloride 1 g oral tablet: 1 tab(s) orally 2 times a day  tamsulosin 0.4 mg oral capsule: 1 cap(s) orally once a day (at bedtime)  zolpidem 5 mg oral tablet: 1 tab(s) orally once a day (at bedtime) allopurinol 300 mg oral tablet: 1 tab(s) orally once a day  aspirin 81 mg oral delayed release tablet: 1 tab(s) orally once a day  atorvastatin: 1  orally once a day  gabapentin 100 mg oral capsule: 2 cap(s) orally 3 times a day  melatonin 3 mg oral tablet: 1 tab(s) orally once a day (at bedtime)  oxyCODONE 5 mg oral tablet: 1 tab(s) orally every 4 hours, As needed, Moderate Pain (4 - 6)  polyethylene glycol 3350 oral powder for reconstitution: 17 gram(s) orally once a day  senna oral tablet: 2 tab(s) orally once a day (at bedtime)  sodium chloride 1 g oral tablet: 1 tab(s) orally 3 times a day  tamsulosin 0.4 mg oral capsule: 1 cap(s) orally once a day (at bedtime)  verapamil 40 mg oral tablet: 1 tab(s) orally 3 times a day hold for SBP &lt; 100   HR &lt; 55   zolpidem 5 mg oral tablet: 1 tab(s) orally once a day (at bedtime)

## 2020-02-10 NOTE — DISCHARGE NOTE PROVIDER - HOSPITAL COURSE
89 yo with metastatic bladder cancer with lung , bone lesions per her oncologist Dr Escamilla, admitted post fall with unability to walk and severe intractable pain in the left leg , MR of LS and T spine ordered , + urinary retention and , MR showed hydronephrosis on the right , mark inserted flomax initiated ,  consulted rec R nephrostomy tube however pt is refusing , Pt si with acute renal failure on day 3 , iv fluid ordered closely being monitored ; persistent  hyponatremia 3% NS fluid chalange given , persistent hyponatremia Na tablets added     Pt is with stable cr , continue to refuse nephrostomy tube insertion , she prefers to d/w Dr escamilla as outpatient and decide     Pt is discharged with mark cathater . 88 yo with metastatic bladder cancer with lung , bone lesions per her oncologist Dr Escamilla, admitted post fall with unability to walk and severe intractable pain in the left leg , MR of LS and T spine ordered , + urinary retention and , MR showed hydronephrosis on the right , mark inserted flomax initiated ,  consulted rec R nephrostomy tube however pt is refusing , Pt si with acute renal failure on day 3 , iv fluid ordered closely being monitored ; persistent  hyponatremia 3% NS fluid chalange given , persistent hyponatremia Na tablets added     Pt is with stable cr , continue to refuse nephrostomy tube insertion , she prefers to d/w Dr escamilla as outpatient and decide     Pt is discharged with mark cathater .    Pt needs Monday Thursday metabolic panel for sodium level , get nephrology consult in rehab     she is stable , NA is 127 on the day of discharge , fluid restriction 1200 cc daily, cont sodium tablets three times day

## 2020-02-10 NOTE — DISCHARGE NOTE NURSING/CASE MANAGEMENT/SOCIAL WORK - NSDCPEPTSTRK_GEN_ALL_CORE
Prescribed medications/Risk factors for stroke/Stroke warning signs and symptoms/Signs and symptoms of stroke/Stroke support groups for patients, families, and friends/Call 911 for stroke/Need for follow up after discharge/Stroke education booklet

## 2020-02-10 NOTE — PROGRESS NOTE ADULT - SUBJECTIVE AND OBJECTIVE BOX
NEPHROLOGY INTERVAL HPI/OVERNIGHT EVENTS:    Examined  Frail    MEDICATIONS  (STANDING):  allopurinol 300 milliGRAM(s) Oral daily  aspirin enteric coated 81 milliGRAM(s) Oral daily  atorvastatin 20 milliGRAM(s) Oral at bedtime  gabapentin 200 milliGRAM(s) Oral three times a day  heparin  Injectable 5000 Unit(s) SubCutaneous every 8 hours  melatonin 3 milliGRAM(s) Oral at bedtime  polyethylene glycol 3350 17 Gram(s) Oral daily  senna 2 Tablet(s) Oral at bedtime  sodium chloride 1 Gram(s) Oral three times a day  tamsulosin 0.4 milliGRAM(s) Oral at bedtime  zolpidem 5 milliGRAM(s) Oral at bedtime    MEDICATIONS  (PRN):  bisacodyl Suppository 10 milliGRAM(s) Rectal daily PRN Constipation  HYDROmorphone  Injectable 0.5 milliGRAM(s) IV Push every 4 hours PRN Severe Pain (7 - 10)  oxyCODONE    IR 5 milliGRAM(s) Oral every 4 hours PRN Moderate Pain (4 - 6)  saline laxative (FLEET) Rectal Enema 1 Enema Rectal once PRN persistent constipation      Allergies    No Known Allergies    Intolerances        Vital Signs Last 24 Hrs  T(C): 36.8 (10 Feb 2020 15:25), Max: 37.1 (10 Feb 2020 07:24)  T(F): 98.2 (10 Feb 2020 15:25), Max: 98.8 (10 Feb 2020 07:24)  HR: 91 (10 Feb 2020 15:25) (91 - 99)  BP: 101/60 (10 Feb 2020 15:25) (101/60 - 113/66)  BP(mean): --  RR: 18 (10 Feb 2020 07:24) (18 - 18)  SpO2: 91% (10 Feb 2020 07:24) (91% - 96%)  Daily     Daily     PHYSICAL EXAM:  GENERAL: Comfortable in bed  HEAD:  NCAT  EYES: EOMI  NERVOUS SYSTEM: alert  CHEST/LUNG: no 02, no wheezes  HEART: RR with systolic murmur  ABDOMEN: soft,  NT  EXTREMITIES: trace leg edema      LABS:                        7.8    16.09 )-----------( 336      ( 09 Feb 2020 10:50 )             24.6     02-10    127<L>  |  89<L>  |  60.0<H>  ----------------------------<  88  4.6   |  22.0  |  1.67<H>    Ca    8.0<L>      10 Feb 2020 07:02                  RADIOLOGY & ADDITIONAL TESTS:

## 2020-02-10 NOTE — DISCHARGE NOTE PROVIDER - NSDCCPCAREPLAN_GEN_ALL_CORE_FT
PRINCIPAL DISCHARGE DIAGNOSIS  Diagnosis: Leg pain  Assessment and Plan of Treatment: pain medication as needed   cont PT as tolerate      SECONDARY DISCHARGE DIAGNOSES  Diagnosis: Urinary bladder cancer  Assessment and Plan of Treatment: follow up with Dr Escamilla   may need right nephrestomy tube for hydronephrosis    Diagnosis: Urinary retention  Assessment and Plan of Treatment: cont mark and flomax    Diagnosis: Acute renal failure (ARF)  Assessment and Plan of Treatment: monitor lytes closely   consider R nephrestomy tube if worsens    Diagnosis: Fall  Assessment and Plan of Treatment:

## 2020-02-10 NOTE — DISCHARGE NOTE PROVIDER - PROVIDER TOKENS
FREE:[LAST:[DR Escamilla],PHONE:[(   )    -],FAX:[(   )    -]],PROVIDER:[TOKEN:[[id number]],FOLLOWUP:[2 weeks]] PROVIDER:[TOKEN:[[id number]],FOLLOWUP:[2 weeks]],FREE:[LAST:[DR Escamilla],PHONE:[(   )    -],FAX:[(   )    -]],PROVIDER:[TOKEN:[[id number]],FOLLOWUP:[1 week]]

## 2020-02-10 NOTE — DISCHARGE NOTE PROVIDER - NSDCFUADDINST_GEN_ALL_CORE_FT
monitor sodium get blood tests twice a week   get nephrology evaluation in the nursing home if possible

## 2020-02-11 PROBLEM — Z00.00 ENCOUNTER FOR PREVENTIVE HEALTH EXAMINATION: Status: ACTIVE | Noted: 2020-02-11

## 2020-03-13 NOTE — CDI QUERY NOTE - NSCDIOTHERTXTBX_GEN_ALL_CORE_HH
is requesting clarification on diagnosis that necessitated admission after study.    Supporting Documentation:  H/P  Assessment:  · Assessment	  88 yo female with bladder  cancer presented to the ED with fall , hip pain and lower back pain , unable to walk since fall. In the ED found to have leukocytosis  CT of chest abd showed hepatic lesion , lung nodules probable ,mets and lung opacity   1- lower back pain with leg pain s/p fall   fracture ruled out   pain meds  PT ambulated   US of lower ext r/o DVT   2- Bladder ca with heaptic lesion and bone mets   will try to reach her oncologist to discuss prognosis nad all findings   cont pain meds as needed   3- HTN - cont home meds with holding parameters   cont verapamil     D/C note: PRINCIPAL DISCHARGE DIAGNOSIS  Diagnosis: Leg pain  Assessment and Plan of Treatment: pain medication as needed   cont PT as tolerate      Please clarify the reason for admission as addendum to discharge note

## 2021-02-26 NOTE — CDI QUERY NOTE - NSCDINOTEPOA_GEN_ALL_CORE_FT
----- Message from Fatmata Carr sent at 2/25/2021 11:48 AM EST -----  Regarding: Care Gap Request  02/25/21 11:48 AM    Hello, our patient Flores Bach has had Diabetic Eye Exam completed/performed  Please assist in updating the patient chart by making an External outreach to [facility] located in [address] [place] [address]       Thank you,  Jewels Patterson Was the condition present on admission? If so, please document in the chart that “(the condition) was present on admission.”

## 2023-03-21 NOTE — PATIENT PROFILE ADULT - NSPROMEDSPATCH_GEN_A_NUR
none Nasal Turnover Hinge Flap Text: The defect edges were debeveled with a #15 scalpel blade.  Given the size, depth, location of the defect and the defect being full thickness a nasal turnover hinge flap was deemed most appropriate. Using a sterile surgical marker, an appropriate hinge flap was drawn incorporating the defect. The area thus outlined was incised with a #15 scalpel blade. The flap was designed to recreate the nasal mucosal lining and the alar rim. The skin margins were undermined to an appropriate distance in all directions utilizing iris scissors. Following this, the designed flap was placed into the primary defect and sutured into place
